# Patient Record
Sex: MALE | Race: ASIAN | NOT HISPANIC OR LATINO | Employment: FULL TIME | URBAN - METROPOLITAN AREA
[De-identification: names, ages, dates, MRNs, and addresses within clinical notes are randomized per-mention and may not be internally consistent; named-entity substitution may affect disease eponyms.]

---

## 2017-04-03 ENCOUNTER — OFFICE VISIT (OUTPATIENT)
Dept: INTERNAL MEDICINE | Age: 47
End: 2017-04-03

## 2017-04-03 VITALS
BODY MASS INDEX: 35.84 KG/M2 | HEIGHT: 66 IN | WEIGHT: 223 LBS | TEMPERATURE: 98.4 F | SYSTOLIC BLOOD PRESSURE: 112 MMHG | HEART RATE: 92 BPM | DIASTOLIC BLOOD PRESSURE: 80 MMHG | OXYGEN SATURATION: 97 %

## 2017-04-03 DIAGNOSIS — I10 ESSENTIAL HYPERTENSION: Chronic | ICD-10-CM

## 2017-04-03 DIAGNOSIS — E11.9 TYPE 2 DIABETES MELLITUS WITHOUT COMPLICATION, WITHOUT LONG-TERM CURRENT USE OF INSULIN (HCC): Primary | Chronic | ICD-10-CM

## 2017-04-03 DIAGNOSIS — C61 PROSTATE CANCER (HCC): Chronic | ICD-10-CM

## 2017-04-03 DIAGNOSIS — E78.2 MIXED HYPERLIPIDEMIA: Chronic | ICD-10-CM

## 2017-04-03 PROCEDURE — 90670 PCV13 VACCINE IM: CPT | Performed by: INTERNAL MEDICINE

## 2017-04-03 PROCEDURE — 90471 IMMUNIZATION ADMIN: CPT | Performed by: INTERNAL MEDICINE

## 2017-04-03 PROCEDURE — 99214 OFFICE O/P EST MOD 30 MIN: CPT | Performed by: INTERNAL MEDICINE

## 2017-04-03 NOTE — PROGRESS NOTES
"Mega Santiago / 46 y.o. / male  04/03/2017    VITALS    /80  Pulse 92  Temp 98.4 °F (36.9 °C)  Ht 66\" (167.6 cm)  Wt 223 lb (101 kg)  SpO2 97%  BMI 35.99 kg/m2  BP Readings from Last 3 Encounters:   04/03/17 112/80   12/05/16 122/80   09/01/16 104/64     Wt Readings from Last 3 Encounters:   04/03/17 223 lb (101 kg)   12/05/16 220 lb (99.8 kg)   09/01/16 216 lb (98 kg)      Body mass index is 35.99 kg/(m^2).    CC:  Main reason(s) for today's visit: Chronic medical; Diabetes; and pt states he had an episode of vertigo last week      HPI:     Chronic type 2 diabetes     Weight has been trending up. Not watching diet/exercise closely.  Home blood sugar levels: does not check  Current therapy: metformin and DPP4 inhibitor    Most recent relevant labs:   Lab Results   Component Value Date    HGBA1C 6.40 (H) 09/01/2016    HGBA1C 5.69 (H) 05/03/2016    HGBA1C 6.7 (H) 09/28/2015    CREATININE 1.09 09/01/2016    LDL 78 09/01/2016    MICROALBUR <3.0 09/28/2015     Chronic essential hypertension  Home BP readings: DOES NOT CHECK BP AT HOME. Compliant with medication(s).  Denies significant problems or side effects. Most recent in-office blood pressure readings:   BP Readings from Last 3 Encounters:   04/03/17 112/80   12/05/16 122/80   09/01/16 104/64     Chronic hyperlipidemia  Current therapy include atorvastatin.  Denies significant problems with medication(s).  Most recent labs:   Lab Results   Component Value Date    LDL 78 09/01/2016    LDL 83 09/28/2015    HDL 49 09/01/2016    HDL 50 09/28/2015    TRIG 73 09/01/2016    TRIG 125 09/28/2015    CHOLHDLRATIO 2.90 09/01/2016    CHOLHDLRATIO 3.2 09/28/2015     H/o prostate cancer followed by [facility]. Planning to pursue treatment. Recent MRI showed no change per patient.     ____________________________________________________________________    ASSESSMENT & PLAN:    Problem List Items Addressed This Visit        High    Type 2 diabetes mellitus without " complication - Primary (Chronic)    Current Assessment & Plan     Improved on diet, exercise, wt loss.  Check BS twice weekly.  Continue Jentadueto.         Relevant Medications    JENTADUETO 2.5-1000 MG tablet    Other Relevant Orders    Hemoglobin A1c    Microalbumin / Creatinine Urine Ratio    TSH+Free T4    Pneumococcal Conjugate Vaccine 13-Valent All (Completed)       Medium    Hypertension (Chronic)    Overview     Stable. Continue valsartan/hctz 320/25.          Relevant Medications    valsartan-hydrochlorothiazide (DIOVAN-HCT) 320-25 MG per tablet       Low    Hyperlipidemia (Chronic)    Overview     Stable. Continue atorvastatin.          Current Assessment & Plan     Fasting labs next visit.          Relevant Medications    atorvastatin (LIPITOR) 40 MG tablet    Prostate cancer (Chronic)    Overview     *Seen at [facility] [place]: planned 1 year observation, no treatment at this time         Current Assessment & Plan     Seeing [facility] team. Planning to pursue treatment (HIFU therapy).             Orders Placed This Encounter   Procedures   • Pneumococcal Conjugate Vaccine 13-Valent All   • Hemoglobin A1c   • Microalbumin / Creatinine Urine Ratio   • TSH+Free T4       Summary/Discussion:     ·       Return in about 4 months (around 8/3/2017) for Diabetes, COME FASTING FOR LABS.    Future Appointments  Date Time Provider Department Center   8/3/2017 8:20 AM Alber Gray MD [facility] None       ____________________________________________________________________    REVIEW OF SYSTEMS    Review of Systems  As noted per HPI  Constitutional neg   Resp neg   CV neg    Gi neg  Gu neg change in urination  Other: As noted per HPI      PHYSICAL EXAMINATION    Physical Exam    Mega had a diabetic foot exam performed today.   During the foot exam he had a monofilament test performed.    Vascular Status -  His exam exhibits right foot vasculature abnormal. His exam exhibits left foot vasculature  abnormal.   Skin Integrity  -  His right foot skin is not intact.   Mega's left foot skin is not intact. .    Constitutional  No distress , obese   Cardiovascular Rate  normal . Rhythm: regular . Heart sounds:  normal    Pulmonary/Chest  Effort normal. Breath sounds:  normal   Psychiatric  Alert. Judgment and thought content normal. Mood normal      REVIEWED DATA:    Labs:   Lab Results   Component Value Date     09/01/2016    K 3.8 09/01/2016    AST 23 09/01/2016    ALT 37 09/01/2016    BUN 22 (H) 09/01/2016    CREATININE 1.09 09/01/2016    CREATININE 0.92 09/28/2015    EGFRIFNONA 73 09/01/2016    EGFRIFAFRI 88 09/01/2016       Lab Results   Component Value Date    GLU 95 09/01/2016     (H) 09/28/2015    HGBA1C 6.40 (H) 09/01/2016    HGBA1C 5.69 (H) 05/03/2016    HGBA1C 6.7 (H) 09/28/2015       Lab Results   Component Value Date    LDL 78 09/01/2016    LDL 83 09/28/2015    HDL 49 09/01/2016    TRIG 73 09/01/2016    CHOLHDLRATIO 2.90 09/01/2016       No results found for: TSH, FREET4     No results found for: WBC, HGB, PLT     Imaging:        Medical Tests:        Summary of old records / correspondence / consultant report:        Request outside records:          ALLERGIES:    Review of patient's allergies indicates no known allergies.    MEDICATIONS:  Current Outpatient Prescriptions   Medication Sig Dispense Refill   • atorvastatin (LIPITOR) 40 MG tablet Take 1 tablet by mouth daily. 90 tablet 1   • Cetirizine HCl 10 MG capsule Take 10 mg by mouth daily.     • Cholecalciferol (VITAMIN D) 2000 UNITS capsule Take 1 capsule by mouth.     • JENTADUETO 2.5-1000 MG tablet Take 1 tablet by mouth  twice a day 180 tablet 0   • valsartan-hydrochlorothiazide (DIOVAN-HCT) 320-25 MG per tablet Take 1 tablet by mouth daily. 90 tablet 3     PFSH    The following portions of the patient's history were reviewed and updated as appropriate: Allergies / Current Medications / Past Medical History / Surgical History  / Social History / Family History    PROBLEM LIST:    Patient Active Problem List   Diagnosis   • Atopic rhinitis   • Hyperlipidemia   • Hypertension   • Hypogonadism in male   • Obesity (BMI 30-39.9)   • Obstructive sleep apnea syndrome   • Type 2 diabetes mellitus without complication   • Vitamin D deficiency   • Prostate cancer   • DDD (degenerative disc disease), lumbar       PAST MEDICAL HX:    Past Medical History:   Diagnosis Date   • Diabetes mellitus    • Hyperlipidemia    • Hypertension    • Hypogonadism in male    • Obesity    • Sleep apnea    • Vitamin D deficiency        PAST SURGICAL HX:    Past Surgical History:   Procedure Laterality Date   • ANKLE SURGERY         SOCIAL HX:    Social History     Social History   • Marital status:      Spouse name: N/A   • Number of children: N/A   • Years of education: N/A     Occupational History   • [employer]      Social History Main Topics   • Smoking status: Never Smoker   • Smokeless tobacco: Never Used   • Alcohol use Yes   • Drug use: None   • Sexual activity: Not Asked     Other Topics Concern   • None     Social History Narrative       FAMILY HX:    Family History   Problem Relation Age of Onset   • Parkinsonism Maternal Uncle    • Colonic polyp Paternal Grandfather    • Diabetes Other    • Hyperlipidemia Other    • Hypertension Other    • Thyroid disease Other          **Dragon Disclaimer:   Much of this encounter note is an electronic transcription/translation of spoken language to printed text. The electronic translation of spoken language may permit erroneous, or at times, nonsensical words or phrases to be inadvertently transcribed. Although I have reviewed the note for such errors, some may still exist.

## 2017-04-04 ENCOUNTER — TELEPHONE (OUTPATIENT)
Dept: INTERNAL MEDICINE | Age: 47
End: 2017-04-04

## 2017-04-04 LAB
ALBUMIN/CREAT UR: <4 MG/G CREAT (ref 0–30)
CREAT UR-MCNC: 75.1 MG/DL
HBA1C MFR BLD: 6.95 % (ref 4.8–5.6)
MICROALBUMIN UR-MCNC: <3 UG/ML
T4 FREE SERPL-MCNC: 1.19 NG/DL (ref 0.93–1.7)
TSH SERPL DL<=0.005 MIU/L-ACNC: 2.76 MIU/ML (ref 0.27–4.2)

## 2017-04-04 NOTE — TELEPHONE ENCOUNTER
----- Message from Alber Gray MD sent at 4/4/2017 12:31 PM EDT -----  Call patient with his test result(s) and mail the results to him if MyChart is NOT active.    Diabetes is going up. Start Jardiance 10 mg QAM (dx: DM 2).  Continue Jentadueto.

## 2017-04-04 NOTE — PROGRESS NOTES
Call patient with his test result(s) and mail the results to him if MyChart is NOT active.    Diabetes is going up. Start Jardiance 10 mg QAM (dx: DM 2).  Continue Jentadueto.

## 2017-04-10 ENCOUNTER — TELEPHONE (OUTPATIENT)
Dept: INTERNAL MEDICINE | Age: 47
End: 2017-04-10

## 2017-04-10 DIAGNOSIS — E11.9 TYPE 2 DIABETES MELLITUS WITHOUT COMPLICATION, WITHOUT LONG-TERM CURRENT USE OF INSULIN (HCC): Primary | Chronic | ICD-10-CM

## 2017-04-10 DIAGNOSIS — IMO0002 UNCONTROLLED TYPE 2 DIABETES MELLITUS: Chronic | ICD-10-CM

## 2017-04-10 RX ORDER — LINAGLIPTIN AND METFORMIN HYDROCHLORIDE 2.5; 1 MG/1; MG/1
TABLET, FILM COATED ORAL
Qty: 180 TABLET | Refills: 1 | Status: SHIPPED | OUTPATIENT
Start: 2017-04-10 | End: 2018-03-25 | Stop reason: SDUPTHER

## 2017-04-10 NOTE — TELEPHONE ENCOUNTER
Called back to get more information from pt concerning this issue, pt states that his temp has only been running 97.9, no SOA, some drainage, which he had upon arrival last Monday, which has never subsided, but has escalated, has some URI/ chest congestion with green mucus since his pneumonia  shot last Monday, has been taking mucin ex  DM and robitussin, has been achy along with all this but wanted to make sure this was not a side affect from the injection. Please advise. NISREEN

## 2017-07-17 DIAGNOSIS — E78.5 HYPERLIPIDEMIA: ICD-10-CM

## 2017-07-17 RX ORDER — ATORVASTATIN CALCIUM 40 MG/1
TABLET, FILM COATED ORAL
Qty: 90 TABLET | Refills: 0 | Status: SHIPPED | OUTPATIENT
Start: 2017-07-17 | End: 2017-12-15 | Stop reason: SDUPTHER

## 2017-07-17 RX ORDER — VALSARTAN AND HYDROCHLOROTHIAZIDE 320; 25 MG/1; MG/1
TABLET, FILM COATED ORAL
Qty: 90 TABLET | Refills: 0 | Status: SHIPPED | OUTPATIENT
Start: 2017-07-17 | End: 2017-12-19 | Stop reason: SDUPTHER

## 2017-10-27 DIAGNOSIS — I10 ESSENTIAL HYPERTENSION: Primary | ICD-10-CM

## 2017-10-27 RX ORDER — VALSARTAN AND HYDROCHLOROTHIAZIDE 320; 25 MG/1; MG/1
1 TABLET, FILM COATED ORAL DAILY
Qty: 90 TABLET | Refills: 0 | Status: SHIPPED | OUTPATIENT
Start: 2017-10-27 | End: 2017-12-19 | Stop reason: SDUPTHER

## 2017-12-15 DIAGNOSIS — E78.5 HYPERLIPIDEMIA: ICD-10-CM

## 2017-12-18 RX ORDER — ATORVASTATIN CALCIUM 40 MG/1
TABLET, FILM COATED ORAL
Qty: 90 TABLET | Refills: 0 | Status: SHIPPED | OUTPATIENT
Start: 2017-12-18 | End: 2018-03-25 | Stop reason: SDUPTHER

## 2017-12-19 ENCOUNTER — OFFICE VISIT (OUTPATIENT)
Dept: INTERNAL MEDICINE | Age: 47
End: 2017-12-19

## 2017-12-19 VITALS
TEMPERATURE: 97.7 F | OXYGEN SATURATION: 95 % | SYSTOLIC BLOOD PRESSURE: 116 MMHG | DIASTOLIC BLOOD PRESSURE: 74 MMHG | WEIGHT: 216 LBS | HEART RATE: 76 BPM | HEIGHT: 66 IN | BODY MASS INDEX: 34.72 KG/M2

## 2017-12-19 DIAGNOSIS — G47.33 OBSTRUCTIVE SLEEP APNEA SYNDROME: Chronic | ICD-10-CM

## 2017-12-19 DIAGNOSIS — Z00.00 PREVENTATIVE HEALTH CARE: Primary | ICD-10-CM

## 2017-12-19 DIAGNOSIS — E78.2 MIXED HYPERLIPIDEMIA: Chronic | ICD-10-CM

## 2017-12-19 DIAGNOSIS — C61 PROSTATE CANCER (HCC): Chronic | ICD-10-CM

## 2017-12-19 DIAGNOSIS — I10 ESSENTIAL HYPERTENSION: ICD-10-CM

## 2017-12-19 DIAGNOSIS — G47.33 OSA ON CPAP: ICD-10-CM

## 2017-12-19 DIAGNOSIS — E11.9 TYPE 2 DIABETES MELLITUS WITHOUT COMPLICATION, WITHOUT LONG-TERM CURRENT USE OF INSULIN (HCC): Primary | Chronic | ICD-10-CM

## 2017-12-19 DIAGNOSIS — Z99.89 OSA ON CPAP: ICD-10-CM

## 2017-12-19 PROCEDURE — 90472 IMMUNIZATION ADMIN EACH ADD: CPT | Performed by: INTERNAL MEDICINE

## 2017-12-19 PROCEDURE — 99214 OFFICE O/P EST MOD 30 MIN: CPT | Performed by: INTERNAL MEDICINE

## 2017-12-19 PROCEDURE — 90686 IIV4 VACC NO PRSV 0.5 ML IM: CPT | Performed by: INTERNAL MEDICINE

## 2017-12-19 PROCEDURE — 90732 PPSV23 VACC 2 YRS+ SUBQ/IM: CPT | Performed by: INTERNAL MEDICINE

## 2017-12-19 PROCEDURE — 90471 IMMUNIZATION ADMIN: CPT | Performed by: INTERNAL MEDICINE

## 2017-12-19 RX ORDER — VALSARTAN AND HYDROCHLOROTHIAZIDE 320; 25 MG/1; MG/1
1 TABLET, FILM COATED ORAL DAILY
Qty: 90 TABLET | Refills: 0
Start: 2017-12-19 | End: 2017-12-29 | Stop reason: SDUPTHER

## 2017-12-19 NOTE — PROGRESS NOTES
"[facility] – [place], [place] INTERNAL MEDICINE  ANGIE LAGUNA M.D.      Mega Glezino / 47 y.o. / male  12/19/2017      MEDICATIONS  Current Outpatient Prescriptions   Medication Sig Dispense Refill   • aspirin 81 MG EC tablet Take 1 tablet by mouth Daily.     • atorvastatin (LIPITOR) 40 MG tablet TAKE 1 TABLET BY MOUTH  DAILY 90 tablet 0   • Cetirizine HCl 10 MG capsule Take 10 mg by mouth daily.     • Cholecalciferol (VITAMIN D) 2000 UNITS capsule Take 1 capsule by mouth.     • JENTADUETO 2.5-1000 MG tablet Take 1 tablet by mouth  twice a day 180 tablet 1   • valsartan-hydrochlorothiazide (DIOVAN-HCT) 320-25 MG per tablet Take 1 tablet by mouth Daily. 90 tablet 0   • Canagliflozin 100 MG tablet Take 1 tablet by mouth Every Morning. 90 tablet 1     No current facility-administered medications for this visit.          VITALS:    Visit Vitals   • /74   • Pulse 76   • Temp 97.7 °F (36.5 °C)   • Ht 167.6 cm (65.98\")   • Wt 98 kg (216 lb)   • SpO2 95%   • BMI 34.88 kg/m2       BP Readings from Last 3 Encounters:   12/19/17 116/74   09/09/17 149/92   04/03/17 112/80     Wt Readings from Last 3 Encounters:   12/19/17 98 kg (216 lb)   09/09/17 100 kg (221 lb)   04/03/17 101 kg (223 lb)      Body mass index is 34.88 kg/(m^2).    CC:  Main reason(s) for today's visit: Diabetes      HPI:     Recent prostate bx did not show cancer but PSA has been slowly rising > 4. Under active surveillance by urologist.     Chronic type 2 diabetes:  Home blood sugar levels: about the same as before, has no significant low sugar symptoms/problems. Current therapy: Jentadueto.  Most recent relevant labs:   Lab Results   Component Value Date    HGBA1C 6.95 (H) 04/03/2017    HGBA1C 6.40 (H) 09/01/2016    HGBA1C 5.69 (H) 05/03/2016    CREATININE 1.09 09/01/2016    LDL 78 09/01/2016    MICROALBUR <3.0 04/03/2017     Chronic essential hypertension:  Since prior visit: compliant with medication(s) and denies significant problems with medication(s).  Most recent " in-office blood pressure readings:   BP Readings from Last 3 Encounters:   12/19/17 116/74   09/09/17 149/92   04/03/17 112/80     Chronic hyperlipidemia:  Current therapy include atorvastatin, denies problems with medication.    Most recent labs:   Lab Results   Component Value Date    LDL 78 09/01/2016    LDL 83 09/28/2015    HDL 49 09/01/2016    HDL 50 09/28/2015    TRIG 73 09/01/2016    CHOLHDLRATIO 2.90 09/01/2016     ELOISE on cpap. Needs cpap titration study.     Patient Care Team:  Alber Gray MD as PCP - General  Cesar Ramos DO as Consulting Physician (Ophthalmology)  ____________________________________________________________________    ASSESSMENT & PLAN:    Problem List Items Addressed This Visit     Type 2 diabetes mellitus without complication - Primary (Chronic)    Overview     Stable. Continue Jentadueto.         Relevant Medications    JENTADUETO 2.5-1000 MG tablet    Canagliflozin 100 MG tablet    Other Relevant Orders    Hemoglobin A1c    Comprehensive Metabolic Panel    Pneumococcal Polysaccharide Vaccine 23-Valent Greater Than or Equal To 3yo Subcutaneous / IM    Hypertension (Chronic)    Overview     Stable. Continue valsartan/hctz 320/25.          Relevant Medications    valsartan-hydrochlorothiazide (DIOVAN-HCT) 320-25 MG per tablet    Hyperlipidemia (Chronic)    Overview     Stable. Continue atorvastatin.          Relevant Medications    atorvastatin (LIPITOR) 40 MG tablet    Other Relevant Orders    Lipid Panel With / Chol / HDL Ratio    Obstructive sleep apnea syndrome (Chronic)    Overview     On CPAP         Prostate cancer (Chronic)    Overview     *Seen at [facility]: planned 1 year observation, no treatment at this time           Other Visit Diagnoses     ELOISE on CPAP        Relevant Orders    Ambulatory Referral to Sleep Medicine        Orders Placed This Encounter   Procedures   • Pneumococcal Polysaccharide Vaccine 23-Valent Greater Than or Equal To 3yo  Subcutaneous / IM   • Hemoglobin A1c   • Lipid Panel With / Chol / HDL Ratio   • Comprehensive Metabolic Panel   • Ambulatory Referral to Sleep Medicine       Summary/Discussion:  ·     Return in about 6 months (around 6/19/2018) for Diabetes and co-morbid conditions.    No future appointments.    ____________________________________________________________________    REVIEW OF SYSTEMS    Review of Systems  Constitutional neg x mild wt loss  Resp neg  CV neg   neg for change  Neuro neg      PHYSICAL EXAMINATION    Physical Exam  Constitutional  No distress, obese   Cardiovascular Rate  normal . Rhythm: regular . Heart sounds:  Normal  Pulmonary/Chest  Effort normal. Breath sounds:  Normal  Psychiatric  Alert. Judgment and thought content normal. Mood normal    REVIEWED DATA:    Labs:     Lab Results   Component Value Date     09/01/2016    K 3.8 09/01/2016    AST 23 09/01/2016    ALT 37 09/01/2016    BUN 22 (H) 09/01/2016    CREATININE 1.09 09/01/2016    CREATININE 0.92 09/28/2015    EGFRIFNONA 73 09/01/2016    EGFRIFAFRI 88 09/01/2016       Lab Results   Component Value Date    HGBA1C 6.95 (H) 04/03/2017    HGBA1C 6.40 (H) 09/01/2016    HGBA1C 5.69 (H) 05/03/2016    GLU 95 09/01/2016     (H) 09/28/2015    MICROALBUR <3.0 04/03/2017       Lab Results   Component Value Date    LDL 78 09/01/2016    LDL 83 09/28/2015    HDL 49 09/01/2016    TRIG 73 09/01/2016    CHOLHDLRATIO 2.90 09/01/2016       Lab Results   Component Value Date    TSH 2.760 04/03/2017    FREET4 1.19 04/03/2017       No results found for: WBC, HGB, PLT    Imaging:         Medical Tests:         Summary of old records / correspondence / consultant report:         Request outside records:         ALLERGIES  No Known Allergies     PFSH:     The following portions of the patient's history were reviewed and updated as appropriate: Allergies / Current Medications / Past Medical History / Surgical History / Social History / Family  History    PROBLEM LIST   Patient Active Problem List   Diagnosis   • Atopic rhinitis   • Hyperlipidemia   • Hypertension   • Hypogonadism in male   • Obesity (BMI 30-39.9)   • Obstructive sleep apnea syndrome   • Type 2 diabetes mellitus without complication   • Vitamin D deficiency   • Prostate cancer   • DDD (degenerative disc disease), lumbar       PAST MEDICAL HISTORY  Past Medical History:   Diagnosis Date   • Diabetes mellitus    • Hyperlipidemia    • Hypertension    • Hypogonadism in male    • Obesity    • Sleep apnea    • Vitamin D deficiency        SURGICAL HISTORY  Past Surgical History:   Procedure Laterality Date   • ANKLE SURGERY         SOCIAL HISTORY  Social History     Social History   • Marital status:      Spouse name: N/A   • Number of children: N/A   • Years of education: N/A     Occupational History   • [employer]      Social History Main Topics   • Smoking status: Never Smoker   • Smokeless tobacco: Never Used   • Alcohol use Yes   • Drug use: None   • Sexual activity: Not Asked     Other Topics Concern   • None     Social History Narrative       FAMILY HISTORY  Family History   Problem Relation Age of Onset   • Parkinsonism Maternal Uncle    • Colonic polyp Paternal Grandfather    • Diabetes Other    • Hyperlipidemia Other    • Hypertension Other    • Thyroid disease Other          **Dragon Disclaimer:   Much of this encounter note is an electronic transcription/translation of spoken language to printed text. The electronic translation of spoken language may permit erroneous, or at times, nonsensical words or phrases to be inadvertently transcribed. Although I have reviewed the note for such errors, some may still exist.

## 2017-12-20 LAB
ALBUMIN SERPL-MCNC: 4.9 G/DL (ref 3.5–5.2)
ALBUMIN/GLOB SERPL: 1.4 G/DL
ALP SERPL-CCNC: 73 U/L (ref 39–117)
ALT SERPL-CCNC: 41 U/L (ref 1–41)
AST SERPL-CCNC: 23 U/L (ref 1–40)
BILIRUB SERPL-MCNC: 0.4 MG/DL (ref 0.1–1.2)
BUN SERPL-MCNC: 20 MG/DL (ref 6–20)
BUN/CREAT SERPL: 16.5 (ref 7–25)
CALCIUM SERPL-MCNC: 9.8 MG/DL (ref 8.6–10.5)
CHLORIDE SERPL-SCNC: 98 MMOL/L (ref 98–107)
CHOLEST SERPL-MCNC: 150 MG/DL (ref 0–200)
CHOLEST/HDLC SERPL: 2.78 {RATIO}
CO2 SERPL-SCNC: 30.1 MMOL/L (ref 22–29)
CREAT SERPL-MCNC: 1.21 MG/DL (ref 0.76–1.27)
GLOBULIN SER CALC-MCNC: 3.5 GM/DL
GLUCOSE SERPL-MCNC: 73 MG/DL (ref 65–99)
HBA1C MFR BLD: 6.45 % (ref 4.8–5.6)
HDLC SERPL-MCNC: 54 MG/DL (ref 40–60)
LDLC SERPL CALC-MCNC: 74 MG/DL (ref 0–100)
POTASSIUM SERPL-SCNC: 4 MMOL/L (ref 3.5–5.2)
PROT SERPL-MCNC: 8.4 G/DL (ref 6–8.5)
SODIUM SERPL-SCNC: 140 MMOL/L (ref 136–145)
TRIGL SERPL-MCNC: 112 MG/DL (ref 0–150)
VLDLC SERPL CALC-MCNC: 22.4 MG/DL (ref 5–40)

## 2017-12-29 DIAGNOSIS — I10 ESSENTIAL HYPERTENSION: ICD-10-CM

## 2018-01-02 RX ORDER — VALSARTAN AND HYDROCHLOROTHIAZIDE 320; 25 MG/1; MG/1
1 TABLET, FILM COATED ORAL DAILY
Qty: 90 TABLET | Refills: 1 | Status: SHIPPED | OUTPATIENT
Start: 2018-01-02 | End: 2018-06-17 | Stop reason: SDUPTHER

## 2018-03-25 DIAGNOSIS — E78.5 HYPERLIPIDEMIA: ICD-10-CM

## 2018-03-25 DIAGNOSIS — IMO0002 UNCONTROLLED TYPE 2 DIABETES MELLITUS: Chronic | ICD-10-CM

## 2018-03-26 RX ORDER — LINAGLIPTIN AND METFORMIN HYDROCHLORIDE 2.5; 1 MG/1; MG/1
TABLET, FILM COATED ORAL
Qty: 180 TABLET | Refills: 0 | Status: SHIPPED | OUTPATIENT
Start: 2018-03-26 | End: 2018-09-18 | Stop reason: SDUPTHER

## 2018-03-26 RX ORDER — ATORVASTATIN CALCIUM 40 MG/1
TABLET, FILM COATED ORAL
Qty: 90 TABLET | Refills: 0 | Status: SHIPPED | OUTPATIENT
Start: 2018-03-26 | End: 2018-06-18 | Stop reason: SDUPTHER

## 2018-05-08 ENCOUNTER — OFFICE VISIT (OUTPATIENT)
Dept: INTERNAL MEDICINE | Age: 48
End: 2018-05-08

## 2018-05-08 VITALS
DIASTOLIC BLOOD PRESSURE: 80 MMHG | BODY MASS INDEX: 34.72 KG/M2 | WEIGHT: 216 LBS | TEMPERATURE: 98 F | HEART RATE: 60 BPM | OXYGEN SATURATION: 98 % | HEIGHT: 66 IN | SYSTOLIC BLOOD PRESSURE: 118 MMHG

## 2018-05-08 DIAGNOSIS — Z00.00 PREVENTATIVE HEALTH CARE: Primary | ICD-10-CM

## 2018-05-08 DIAGNOSIS — M26.609 TMJ (TEMPOROMANDIBULAR JOINT DISORDER): Primary | ICD-10-CM

## 2018-05-08 PROCEDURE — 90471 IMMUNIZATION ADMIN: CPT | Performed by: INTERNAL MEDICINE

## 2018-05-08 PROCEDURE — 90632 HEPA VACCINE ADULT IM: CPT | Performed by: INTERNAL MEDICINE

## 2018-05-08 PROCEDURE — 99213 OFFICE O/P EST LOW 20 MIN: CPT | Performed by: INTERNAL MEDICINE

## 2018-05-08 NOTE — ASSESSMENT & PLAN NOTE
This is a new problem to this examiner. Educational handout given on condition. Recommended seeing his dentist for night bite guard fitting. Aleve 2 BID x 5 days. Ice/heat as needed. He was instructed to call or return if the condition is not improving or worsening. He expressed understanding and agreed to follow above instructions.

## 2018-05-08 NOTE — PROGRESS NOTES
"[facility] – [place] INTERNAL MEDICINE  ANGIE LAGUNA M.D.      Mega Santiago / 47 y.o. / male  05/08/2018      ASSESSMENT & PLAN:    Problem List Items Addressed This Visit        High    TMJ (temporomandibular joint disorder) - Primary    Current Assessment & Plan     This is a new problem to this examiner. Educational handout given on condition. Recommended seeing his dentist for night bite guard fitting. Aleve 2 BID x 5 days. Ice/heat as needed. He was instructed to call or return if the condition is not improving or worsening. He expressed understanding and agreed to follow above instructions.             Other Visit Diagnoses    None.          Summary/Discussion:      Return for worsening problem or if not improving, Next scheduled follow up.    Future Appointments  Date Time Provider Department Center   6/19/2018 7:40 AM Alber Laguna MD [facility] None     ____________________________________________________________________    MEDICATIONS  Current Outpatient Prescriptions   Medication Sig Dispense Refill   • aspirin 81 MG EC tablet Take 1 tablet by mouth Daily.     • atorvastatin (LIPITOR) 40 MG tablet TAKE 1 TABLET BY MOUTH  DAILY 90 tablet 0   • Canagliflozin 100 MG tablet Take 1 tablet by mouth Every Morning. 90 tablet 1   • Cetirizine HCl 10 MG capsule Take 10 mg by mouth daily.     • Cholecalciferol (VITAMIN D) 2000 UNITS capsule Take 1 capsule by mouth.     • JENTADUETO 2.5-1000 MG tablet TAKE 1 TABLET BY MOUTH  TWICE A  tablet 0   • valsartan-hydrochlorothiazide (DIOVAN-HCT) 320-25 MG per tablet Take 1 tablet by mouth Daily. 90 tablet 1     No current facility-administered medications for this visit.          VITALS    Visit Vitals  /80   Pulse 60   Temp 98 °F (36.7 °C)   Ht 167.6 cm (65.98\")   Wt 98 kg (216 lb)   SpO2 98%   BMI 34.88 kg/m²       BP Readings from Last 3 Encounters:   05/08/18 118/80   12/19/17 116/74   09/09/17 149/92     Wt Readings from Last 3 Encounters:   05/08/18 98 kg (216 lb) "   12/19/17 98 kg (216 lb)   09/09/17 100 kg (221 lb)      Body mass index is 34.88 kg/m².      CC:  Main reason(s) for today's visit: Earache (right ear x 2 weeks)      HPI:     2 weeks of right jaw pain. Worse with chewing. Has shooting pain up the right temple. Some right ear pain without fever or drainage.   Right side neck pain. Denies sinus pressure/pain. Denies dental pain. Has h/o cracking his teeth. Has not seen dentist in several years.   Pain is generally moderate in severity.     Patient Care Team:  Alber Gray MD as PCP - General  Cesar Ramos DO as Consulting Physician (Ophthalmology)  ____________________________________________________________________      REVIEW OF SYSTEMS    Review of Systems  No fever  No parotid mass  No sinus pain    PHYSICAL EXAMINATION    Physical Exam  NAD  No parotid mass  TM's clear, no drainage  No sinus tenderness  No cervical LAD    REVIEWED DATA:    Labs:       Imaging:        Medical Tests:        Summary of old records / correspondence / consultant report:        Request outside records:       ALLERGIES  No Known Allergies     PFSH:     The following portions of the patient's history were reviewed and updated as appropriate: Allergies / Current Medications / Past Medical History / Surgical History / Social History / Family History    PROBLEM LIST   Patient Active Problem List   Diagnosis   • Atopic rhinitis   • Hyperlipidemia   • Hypertension   • Hypogonadism in male   • Obesity (BMI 30-39.9)   • Obstructive sleep apnea syndrome   • Type 2 diabetes mellitus without complication   • Vitamin D deficiency   • Prostate cancer   • DDD (degenerative disc disease), lumbar   • TMJ (temporomandibular joint disorder)       PAST MEDICAL HISTORY  Past Medical History:   Diagnosis Date   • Diabetes mellitus    • Hyperlipidemia    • Hypertension    • Hypogonadism in male    • Obesity    • Sleep apnea    • Vitamin D deficiency        SURGICAL HISTORY  Past Surgical History:    Procedure Laterality Date   • ANKLE SURGERY         SOCIAL HISTORY  Social History     Social History   • Marital status:      Occupational History   • [employer]      Social History Main Topics   • Smoking status: Never Smoker   • Smokeless tobacco: Never Used   • Alcohol use Yes   • Drug use: Unknown     Other Topics Concern   • Not on file       FAMILY HISTORY  Family History   Problem Relation Age of Onset   • Parkinsonism Maternal Uncle    • Colonic polyp Paternal Grandfather    • Diabetes Other    • Hyperlipidemia Other    • Hypertension Other    • Thyroid disease Other          **Dragon Disclaimer:   Much of this encounter note is an electronic transcription/translation of spoken language to printed text. The electronic translation of spoken language may permit erroneous, or at times, nonsensical words or phrases to be inadvertently transcribed. Although I have reviewed the note for such errors, some may still exist.

## 2018-06-17 DIAGNOSIS — I10 ESSENTIAL HYPERTENSION: ICD-10-CM

## 2018-06-18 DIAGNOSIS — E78.5 HYPERLIPIDEMIA: ICD-10-CM

## 2018-06-18 RX ORDER — ATORVASTATIN CALCIUM 40 MG/1
TABLET, FILM COATED ORAL
Qty: 90 TABLET | Refills: 1 | Status: SHIPPED | OUTPATIENT
Start: 2018-06-18 | End: 2018-12-07 | Stop reason: SDUPTHER

## 2018-06-18 RX ORDER — VALSARTAN AND HYDROCHLOROTHIAZIDE 320; 25 MG/1; MG/1
1 TABLET, FILM COATED ORAL DAILY
Qty: 90 TABLET | Refills: 1 | Status: SHIPPED | OUTPATIENT
Start: 2018-06-18 | End: 2018-06-20 | Stop reason: SDUPTHER

## 2018-06-19 ENCOUNTER — OFFICE VISIT (OUTPATIENT)
Dept: INTERNAL MEDICINE | Age: 48
End: 2018-06-19

## 2018-06-19 VITALS
DIASTOLIC BLOOD PRESSURE: 68 MMHG | SYSTOLIC BLOOD PRESSURE: 118 MMHG | HEART RATE: 80 BPM | HEIGHT: 66 IN | BODY MASS INDEX: 34.55 KG/M2 | TEMPERATURE: 98 F | OXYGEN SATURATION: 98 % | WEIGHT: 215 LBS

## 2018-06-19 DIAGNOSIS — E11.9 TYPE 2 DIABETES MELLITUS WITHOUT COMPLICATION, WITHOUT LONG-TERM CURRENT USE OF INSULIN (HCC): Primary | Chronic | ICD-10-CM

## 2018-06-19 DIAGNOSIS — I10 ESSENTIAL HYPERTENSION: Chronic | ICD-10-CM

## 2018-06-19 DIAGNOSIS — E78.2 MIXED HYPERLIPIDEMIA: Chronic | ICD-10-CM

## 2018-06-19 PROBLEM — M26.609 TMJ (TEMPOROMANDIBULAR JOINT DISORDER): Status: RESOLVED | Noted: 2018-05-08 | Resolved: 2018-06-19

## 2018-06-19 PROCEDURE — 99214 OFFICE O/P EST MOD 30 MIN: CPT | Performed by: INTERNAL MEDICINE

## 2018-06-19 NOTE — PROGRESS NOTES
"[facility] [place] INTERNAL MEDICINE  ANGIE LAGUNA M.D.      Mega Santiago / 47 y.o. / male  06/19/2018      ASSESSMENT & PLAN:    Problem List Items Addressed This Visit        High    Type 2 diabetes mellitus without complication - Primary (Chronic)    Current Assessment & Plan     Continue Jentadueto.          Relevant Medications    JENTADUETO 2.5-1000 MG tablet    Other Relevant Orders    Hemoglobin A1c    Comprehensive Metabolic Panel    Microalbumin / Creatinine Urine Ratio - Urine, Clean Catch       Medium    Hypertension (Chronic)    Overview     Stable. Continue valsartan/hctz 320/25.          Relevant Medications    valsartan-hydrochlorothiazide (DIOVAN-HCT) 320-25 MG per tablet       Low    Hyperlipidemia (Chronic)    Overview     Stable. Continue atorvastatin.          Relevant Medications    atorvastatin (LIPITOR) 40 MG tablet        Orders Placed This Encounter   Procedures   • Hemoglobin A1c   • Comprehensive Metabolic Panel   • Microalbumin / Creatinine Urine Ratio - Urine, Clean Catch       Summary/Discussion:      Return in about 6 months (around 12/19/2018) for Diabetes and co-morbid conditions.  ____________________________________________________________________    MEDICATIONS  Current Outpatient Prescriptions   Medication Sig Dispense Refill   • aspirin 81 MG EC tablet Take 1 tablet by mouth Daily.     • atorvastatin (LIPITOR) 40 MG tablet TAKE 1 TABLET BY MOUTH  DAILY 90 tablet 1   • Cetirizine HCl 10 MG capsule Take 10 mg by mouth daily.     • Cholecalciferol (VITAMIN D) 2000 UNITS capsule Take 1 capsule by mouth.     • JENTADUETO 2.5-1000 MG tablet TAKE 1 TABLET BY MOUTH  TWICE A  tablet 0   • valsartan-hydrochlorothiazide (DIOVAN-HCT) 320-25 MG per tablet TAKE 1 TABLET BY MOUTH  DAILY 90 tablet 1     No current facility-administered medications for this visit.          VITALS    Visit Vitals  /68   Pulse 80   Temp 98 °F (36.7 °C)   Ht 167.6 cm (65.98\")   Wt 97.5 kg (215 lb)   SpO2 98% "   BMI 34.72 kg/m²       BP Readings from Last 3 Encounters:   06/19/18 118/68   05/08/18 118/80   12/19/17 116/74     Wt Readings from Last 3 Encounters:   06/19/18 97.5 kg (215 lb)   05/08/18 98 kg (216 lb)   12/19/17 98 kg (216 lb)      Body mass index is 34.72 kg/m².    CC:  Main reason(s) for today's visit: Follow-up for diabetes and related medical problems    HPI:     Chronic type 2 diabetes:  Home blood sugar levels: does not check, has no significant low sugar symptoms/problems. Current therapy: Jentadueto.  Most recent relevant labs:   Lab Results   Component Value Date    HGBA1C 6.45 (H) 12/19/2017    HGBA1C 6.95 (H) 04/03/2017    HGBA1C 6.40 (H) 09/01/2016    CREATININE 1.21 12/19/2017    LDL 74 12/19/2017    MICROALBUR <3.0 04/03/2017     Chronic essential hypertension:  Since prior visit: compliant with medication(s) and denies significant problems with medication(s).  Most recent in-office blood pressure readings:   BP Readings from Last 3 Encounters:   06/19/18 118/68   05/08/18 118/80   12/19/17 116/74     Chronic hyperlipidemia:  Current therapy include atorvastatin, denies problems with medication.    Most recent labs:   Lab Results   Component Value Date    LDL 74 12/19/2017    LDL 78 09/01/2016    LDL 83 09/28/2015    HDL 54 12/19/2017    HDL 49 09/01/2016    TRIG 112 12/19/2017    CHOLHDLRATIO 2.78 12/19/2017         Patient Care Team:  Alber Gray MD as PCP - General  Cesar Ramos DO as Consulting Physician (Ophthalmology)  ____________________________________________________________________    REVIEW OF SYSTEMS    Review of Systems  As noted per HPI  Constitutional neg  Resp neg  CV neg      PHYSICAL EXAMINATION    Physical Exam    Mega had a diabetic foot exam performed today.   During the foot exam he had a monofilament test performed (normal).  Vascular Status -  His right foot exhibits abnormal foot vasculature . His left foot exhibits abnormal foot vasculature .  Skin  Integrity  -  His right foot skin is not intact.  His left foot skin is not intact..    Constitutional  No distress, obese   Cardiovascular Rate  normal . Rhythm: regular . Heart sounds:  normal  Pulmonary/Chest  Effort normal. Breath sounds:  normal  Psychiatric  Alert. Judgment and thought content normal. Mood normal    REVIEWED DATA:    Labs:   Lab Results   Component Value Date     12/19/2017    K 4.0 12/19/2017    AST 23 12/19/2017    ALT 41 12/19/2017    BUN 20 12/19/2017    CREATININE 1.21 12/19/2017    CREATININE 1.09 09/01/2016    CREATININE 0.92 09/28/2015    EGFRIFNONA 64 12/19/2017    EGFRIFAFRI 77 12/19/2017       Lab Results   Component Value Date    HGBA1C 6.45 (H) 12/19/2017    HGBA1C 6.95 (H) 04/03/2017    HGBA1C 6.40 (H) 09/01/2016    MICROALBUR <3.0 04/03/2017       Lab Results   Component Value Date    LDL 74 12/19/2017    LDL 78 09/01/2016    LDL 83 09/28/2015    HDL 54 12/19/2017    HDL 49 09/01/2016    TRIG 112 12/19/2017    CHOLHDLRATIO 2.78 12/19/2017       Lab Results   Component Value Date    TSH 2.760 04/03/2017    FREET4 1.19 04/03/2017        No results found for: WBC, HGB, PLT     Imaging:        Medical Tests:        Summary of old records / correspondence / consultant report:        Request outside records:        ALLERGIES  No Known Allergies     PFSH:     The following portions of the patient's history were reviewed and updated as appropriate: Allergies / Current Medications / Past Medical History / Surgical History / Social History / Family History    PROBLEM LIST   Patient Active Problem List   Diagnosis   • Atopic rhinitis   • Hyperlipidemia   • Hypertension   • Hypogonadism in male   • Obesity (BMI 30-39.9)   • Obstructive sleep apnea syndrome   • Type 2 diabetes mellitus without complication   • Vitamin D deficiency   • Prostate cancer   • DDD (degenerative disc disease), lumbar       PAST MEDICAL HISTORY  Past Medical History:   Diagnosis Date   • Diabetes mellitus    •  Hyperlipidemia    • Hypertension    • Hypogonadism in male    • Obesity    • Sleep apnea    • Vitamin D deficiency        SURGICAL HISTORY  Past Surgical History:   Procedure Laterality Date   • ANKLE SURGERY         SOCIAL HISTORY  Social History     Social History   • Marital status:      Occupational History   • [employer]      Social History Main Topics   • Smoking status: Never Smoker   • Smokeless tobacco: Never Used   • Alcohol use Yes   • Drug use: Unknown     Other Topics Concern   • Not on file       FAMILY HISTORY  Family History   Problem Relation Age of Onset   • Parkinsonism Maternal Uncle    • Colonic polyp Paternal Grandfather    • Diabetes Other    • Hyperlipidemia Other    • Hypertension Other    • Thyroid disease Other          **Dragon Disclaimer:   Much of this encounter note is an electronic transcription/translation of spoken language to printed text. The electronic translation of spoken language may permit erroneous, or at times, nonsensical words or phrases to be inadvertently transcribed. Although I have reviewed the note for such errors, some may still exist.

## 2018-06-20 ENCOUNTER — TELEPHONE (OUTPATIENT)
Dept: INTERNAL MEDICINE | Age: 48
End: 2018-06-20

## 2018-06-20 DIAGNOSIS — I10 ESSENTIAL HYPERTENSION: ICD-10-CM

## 2018-06-20 LAB
ALBUMIN SERPL-MCNC: 4.5 G/DL (ref 3.5–5.2)
ALBUMIN/CREAT UR: 3.2 MG/G CREAT (ref 0–30)
ALBUMIN/GLOB SERPL: 1.4 G/DL
ALP SERPL-CCNC: 62 U/L (ref 39–117)
ALT SERPL-CCNC: 38 U/L (ref 1–41)
AST SERPL-CCNC: 20 U/L (ref 1–40)
BILIRUB SERPL-MCNC: 0.6 MG/DL (ref 0.1–1.2)
BUN SERPL-MCNC: 17 MG/DL (ref 6–20)
BUN/CREAT SERPL: 16.3 (ref 7–25)
CALCIUM SERPL-MCNC: 9.5 MG/DL (ref 8.6–10.5)
CHLORIDE SERPL-SCNC: 100 MMOL/L (ref 98–107)
CO2 SERPL-SCNC: 27.1 MMOL/L (ref 22–29)
CREAT SERPL-MCNC: 1.04 MG/DL (ref 0.76–1.27)
CREAT UR-MCNC: 158.2 MG/DL
GFR SERPLBLD CREATININE-BSD FMLA CKD-EPI: 76 ML/MIN/1.73
GFR SERPLBLD CREATININE-BSD FMLA CKD-EPI: 92 ML/MIN/1.73
GLOBULIN SER CALC-MCNC: 3.2 GM/DL
GLUCOSE SERPL-MCNC: 133 MG/DL (ref 65–99)
HBA1C MFR BLD: 6.42 % (ref 4.8–5.6)
MICROALBUMIN UR-MCNC: 5 UG/ML
POTASSIUM SERPL-SCNC: 3.9 MMOL/L (ref 3.5–5.2)
PROT SERPL-MCNC: 7.7 G/DL (ref 6–8.5)
SODIUM SERPL-SCNC: 141 MMOL/L (ref 136–145)

## 2018-06-20 RX ORDER — VALSARTAN AND HYDROCHLOROTHIAZIDE 320; 25 MG/1; MG/1
1 TABLET, FILM COATED ORAL DAILY
Qty: 90 TABLET | Refills: 1 | Status: SHIPPED | OUTPATIENT
Start: 2018-06-20 | End: 2018-06-20 | Stop reason: SDUPTHER

## 2018-06-20 RX ORDER — VALSARTAN AND HYDROCHLOROTHIAZIDE 320; 25 MG/1; MG/1
1 TABLET, FILM COATED ORAL DAILY
Qty: 90 TABLET | Refills: 1 | Status: SHIPPED | OUTPATIENT
Start: 2018-06-20 | End: 2018-12-04 | Stop reason: SDUPTHER

## 2018-06-20 NOTE — TELEPHONE ENCOUNTER
Pt needed a his valsartan sent to [facility]. It went to [facility] by mistake. He has already called [facility] and they are cancelling the order there. Please just send valsartan rx to [facility].

## 2018-07-19 ENCOUNTER — TELEPHONE (OUTPATIENT)
Dept: INTERNAL MEDICINE | Age: 48
End: 2018-07-19

## 2018-07-19 NOTE — TELEPHONE ENCOUNTER
Pt called regarding recall on medication Valsartan HCTZ. Pt asking what would Dr Gray replace this with?  [facility]  Pt's # [phone]

## 2018-09-18 DIAGNOSIS — IMO0002 UNCONTROLLED TYPE 2 DIABETES MELLITUS: Chronic | ICD-10-CM

## 2018-09-18 RX ORDER — LINAGLIPTIN AND METFORMIN HYDROCHLORIDE 2.5; 1 MG/1; MG/1
TABLET, FILM COATED ORAL
Qty: 180 TABLET | Refills: 3 | Status: SHIPPED | OUTPATIENT
Start: 2018-09-18 | End: 2020-03-09 | Stop reason: SDUPTHER

## 2018-10-26 ENCOUNTER — OFFICE VISIT (OUTPATIENT)
Dept: INTERNAL MEDICINE | Age: 48
End: 2018-10-26

## 2018-10-26 VITALS
WEIGHT: 212 LBS | SYSTOLIC BLOOD PRESSURE: 120 MMHG | BODY MASS INDEX: 34.07 KG/M2 | HEART RATE: 82 BPM | TEMPERATURE: 98 F | OXYGEN SATURATION: 99 % | HEIGHT: 66 IN | DIASTOLIC BLOOD PRESSURE: 80 MMHG

## 2018-10-26 DIAGNOSIS — Z23 FLU VACCINE NEED: ICD-10-CM

## 2018-10-26 DIAGNOSIS — N20.1 URETEROLITHIASIS: ICD-10-CM

## 2018-10-26 DIAGNOSIS — N13.30 HYDROURETERONEPHROSIS: Primary | ICD-10-CM

## 2018-10-26 PROCEDURE — 90674 CCIIV4 VAC NO PRSV 0.5 ML IM: CPT | Performed by: INTERNAL MEDICINE

## 2018-10-26 PROCEDURE — 90471 IMMUNIZATION ADMIN: CPT | Performed by: INTERNAL MEDICINE

## 2018-10-26 PROCEDURE — 99214 OFFICE O/P EST MOD 30 MIN: CPT | Performed by: INTERNAL MEDICINE

## 2018-10-26 NOTE — PROGRESS NOTES
"[facility] [place] [facility]NAL MEDICINE  ANGIE LAGUNA M.D.      Mega Santiago / 47 y.o. / male  10/26/2018      ASSESSMENT & PLAN:    1. Hydroureteronephrosis    2. Ureterolithiasis    3. Flu vaccine need      Orders Placed This Encounter   Procedures   • Flucelvax Quad=>4Years (PFS)     No orders of the defined types were placed in this encounter.       Summary/Discussion:  Recommended discussing with his urologist re: above. Should have follow-up imaging to confirm passage of stones and relief of obstruction.   Reassurance given that his palpable epigastric lump is xiphoid process.     No Follow-up on file.    ____________________________________________________________________    MEDICATIONS  Current Outpatient Prescriptions   Medication Sig Dispense Refill   • atorvastatin (LIPITOR) 40 MG tablet TAKE 1 TABLET BY MOUTH  DAILY 90 tablet 1   • Cetirizine HCl 10 MG capsule Take 10 mg by mouth daily.     • Cholecalciferol (VITAMIN D) 2000 UNITS capsule Take 1 capsule by mouth.     • JENTADUETO 2.5-1000 MG tablet TAKE 1 TABLET BY MOUTH  TWICE A  tablet 3   • valsartan-hydrochlorothiazide (DIOVAN-HCT) 320-25 MG per tablet Take 1 tablet by mouth Daily. 90 tablet 1   • aspirin 81 MG EC tablet Take 1 tablet by mouth Daily.       No current facility-administered medications for this visit.          VITALS    Visit Vitals  /80   Pulse 82   Temp 98 °F (36.7 °C)   Ht 167.6 cm (65.98\")   Wt 96.2 kg (212 lb)   SpO2 99%   BMI 34.23 kg/m²       BP Readings from Last 3 Encounters:   10/26/18 120/80   06/19/18 118/68   05/08/18 118/80     Wt Readings from Last 3 Encounters:   10/26/18 96.2 kg (212 lb)   06/19/18 97.5 kg (215 lb)   05/08/18 98 kg (216 lb)      Body mass index is 34.23 kg/m².      CC:  Main reason(s) for today's visit: lump on chest (x 2 weeks)      HPI:     Noticed a \"lump\" on epigastric/lower chest region. No pain or swelling.     Seen in ED in September for right ureterolithiasis with mild hydroureteronephrosis. " Present with severe right flank pain.  Pain has resolved since then.  Denies fever, gross hematuria or recurrent flank pain.  Denies changes in urination.  He has not followed up with the urologist as instructed from the ER.    Patient Care Team:  Alber Gray MD as PCP - Cesar Sweet DO as Consulting Physician (Ophthalmology)  ____________________________________________________________________      REVIEW OF SYSTEMS    Review of Systems  GI neg   neg for gross hematuria, fever    PHYSICAL EXAMINATION    Physical Exam   Constitutional: No distress.   Abdominal: Soft. There is no tenderness. No hernia.             REVIEWED DATA:    Labs:       Imaging:   EXAMINATION: CT OF THE ABDOMEN AND PELVIS  WITH IV CONTRAST.    DATE OF EXAM(S): 09/16/2018 at 0115 hours.    CLINICAL HISTORY: Abdominal pain, right flank pain radiating to the right lower quadrant; mildly elevated amylase, normal WBC.    MIPS: Dose reduction techniques were employed per ALARA protocol.    FINDINGS: Mild right hydroureteronephrosis is seen to the level of two adjacent  3 mm calculi in the proximal right ureter (coronal image 57), located just above the L4-L5 disc level. Several right renal cortical cysts are noted, largest in the mid   kidney measuring 3.8 cm. The left kidney, ureter, and bladder are normal.    The appendix is normal. Bowel loops are normal in caliber with no wall thickening or inflammation. The stomach contains moderate fluid and debris.    Diffuse fatty infiltration of the liver is noted. Spleen, contracted gallbladder, pancreas, and adrenal glands are normal. No acute bone abnormality. Lung bases show mild dependent atelectasis.    IMPRESSION:  1. Mild right hydroureteronephrosis to the level of two adjacent 3 mm calculi in the proximal ureter.  2. Fatty infiltration of the liver.     Medical Tests:       Summary of old records / correspondence / consultant report:        Request outside records:        ALLERGIES  No Known Allergies     PFSH:     The following portions of the patient's history were reviewed and updated as appropriate: Allergies / Current Medications / Past Medical History / Surgical History / Social History / Family History    PROBLEM LIST   Patient Active Problem List   Diagnosis   • Atopic rhinitis   • Hyperlipidemia   • Hypertension   • Hypogonadism in male   • Obesity (BMI 30-39.9)   • Obstructive sleep apnea syndrome   • Type 2 diabetes mellitus without complication (CMS/HCC)   • Vitamin D deficiency   • Prostate cancer (CMS/HCC)   • DDD (degenerative disc disease), lumbar   • Flu vaccine need       PAST MEDICAL HISTORY  Past Medical History:   Diagnosis Date   • Diabetes mellitus (CMS/HCC)    • Hyperlipidemia    • Hypertension    • Hypogonadism in male    • Obesity    • Sleep apnea    • Vitamin D deficiency        SURGICAL HISTORY  Past Surgical History:   Procedure Laterality Date   • ANKLE SURGERY         SOCIAL HISTORY  Social History     Social History   • Marital status:      Occupational History   • [employer]      Social History Main Topics   • Smoking status: Never Smoker   • Smokeless tobacco: Never Used   • Alcohol use Yes   • Drug use: Unknown     Other Topics Concern   • Not on file       FAMILY HISTORY  Family History   Problem Relation Age of Onset   • Parkinsonism Maternal Uncle    • Colonic polyp Paternal Grandfather    • Diabetes Other    • Hyperlipidemia Other    • Hypertension Other    • Thyroid disease Other          **Dragon Disclaimer:   Much of this encounter note is an electronic transcription/translation of spoken language to printed text. The electronic translation of spoken language may permit erroneous, or at times, nonsensical words or phrases to be inadvertently transcribed. Although I have reviewed the note for such errors, some may still exist.

## 2018-12-04 DIAGNOSIS — I10 ESSENTIAL HYPERTENSION: ICD-10-CM

## 2018-12-05 RX ORDER — VALSARTAN AND HYDROCHLOROTHIAZIDE 320; 25 MG/1; MG/1
1 TABLET, FILM COATED ORAL DAILY
Qty: 90 TABLET | Refills: 1 | Status: SHIPPED | OUTPATIENT
Start: 2018-12-05 | End: 2019-07-02 | Stop reason: SDUPTHER

## 2018-12-07 DIAGNOSIS — E78.5 HYPERLIPIDEMIA: ICD-10-CM

## 2018-12-07 RX ORDER — ATORVASTATIN CALCIUM 40 MG/1
TABLET, FILM COATED ORAL
Qty: 90 TABLET | Refills: 3 | Status: SHIPPED | OUTPATIENT
Start: 2018-12-07 | End: 2019-12-27

## 2018-12-14 ENCOUNTER — OFFICE VISIT (OUTPATIENT)
Dept: INTERNAL MEDICINE | Age: 48
End: 2018-12-14

## 2018-12-14 VITALS
HEART RATE: 78 BPM | WEIGHT: 213 LBS | DIASTOLIC BLOOD PRESSURE: 78 MMHG | SYSTOLIC BLOOD PRESSURE: 120 MMHG | BODY MASS INDEX: 34.23 KG/M2 | TEMPERATURE: 98.5 F | OXYGEN SATURATION: 97 % | HEIGHT: 66 IN

## 2018-12-14 DIAGNOSIS — E78.2 MIXED HYPERLIPIDEMIA: Chronic | ICD-10-CM

## 2018-12-14 DIAGNOSIS — I10 ESSENTIAL HYPERTENSION: Chronic | ICD-10-CM

## 2018-12-14 DIAGNOSIS — Z23 NEED FOR HEPATITIS A VACCINATION: ICD-10-CM

## 2018-12-14 DIAGNOSIS — E11.9 TYPE 2 DIABETES MELLITUS WITHOUT COMPLICATION, WITHOUT LONG-TERM CURRENT USE OF INSULIN (HCC): Primary | Chronic | ICD-10-CM

## 2018-12-14 LAB
CHOLEST SERPL-MCNC: 151 MG/DL (ref 0–200)
CHOLEST/HDLC SERPL: 2.8 {RATIO}
HBA1C MFR BLD: 6 % (ref 4.8–5.6)
HDLC SERPL-MCNC: 54 MG/DL (ref 40–60)
LDLC SERPL CALC-MCNC: 79 MG/DL (ref 0–100)
TRIGL SERPL-MCNC: 89 MG/DL (ref 0–150)
VLDLC SERPL CALC-MCNC: 17.8 MG/DL (ref 5–40)

## 2018-12-14 PROCEDURE — 99214 OFFICE O/P EST MOD 30 MIN: CPT | Performed by: INTERNAL MEDICINE

## 2018-12-14 PROCEDURE — 90632 HEPA VACCINE ADULT IM: CPT | Performed by: INTERNAL MEDICINE

## 2018-12-14 PROCEDURE — 90471 IMMUNIZATION ADMIN: CPT | Performed by: INTERNAL MEDICINE
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
## 2019-07-02 DIAGNOSIS — I10 ESSENTIAL HYPERTENSION: ICD-10-CM

## 2019-07-02 RX ORDER — VALSARTAN AND HYDROCHLOROTHIAZIDE 320; 25 MG/1; MG/1
1 TABLET, FILM COATED ORAL DAILY
Qty: 90 TABLET | Refills: 3 | Status: SHIPPED | OUTPATIENT
Start: 2019-07-02 | End: 2020-09-17 | Stop reason: SDUPTHER

## 2019-09-12 ENCOUNTER — OFFICE VISIT (OUTPATIENT)
Dept: INTERNAL MEDICINE | Age: 49
End: 2019-09-12

## 2019-09-12 VITALS
TEMPERATURE: 97.5 F | DIASTOLIC BLOOD PRESSURE: 84 MMHG | BODY MASS INDEX: 34.78 KG/M2 | OXYGEN SATURATION: 92 % | HEART RATE: 95 BPM | SYSTOLIC BLOOD PRESSURE: 126 MMHG | WEIGHT: 216.4 LBS | HEIGHT: 66 IN

## 2019-09-12 DIAGNOSIS — I10 ESSENTIAL HYPERTENSION: Chronic | ICD-10-CM

## 2019-09-12 DIAGNOSIS — Z00.00 ENCOUNTER FOR ANNUAL HEALTH EXAMINATION: Primary | ICD-10-CM

## 2019-09-12 DIAGNOSIS — E11.9 TYPE 2 DIABETES MELLITUS WITHOUT COMPLICATION, WITHOUT LONG-TERM CURRENT USE OF INSULIN (HCC): Chronic | ICD-10-CM

## 2019-09-12 DIAGNOSIS — C61 PROSTATE CANCER (HCC): Chronic | ICD-10-CM

## 2019-09-12 DIAGNOSIS — E78.2 MIXED HYPERLIPIDEMIA: Chronic | ICD-10-CM

## 2019-09-12 PROCEDURE — 99396 PREV VISIT EST AGE 40-64: CPT | Performed by: INTERNAL MEDICINE

## 2019-09-12 PROCEDURE — 99213 OFFICE O/P EST LOW 20 MIN: CPT | Performed by: INTERNAL MEDICINE

## 2019-09-12 NOTE — PROGRESS NOTES
"[facility] [place] INTERNAL MEDICINE  ANGIE LAGUNA M.D.      Mega Santiago / 48 y.o. / male  09/12/2019      ASSESSMENT & PLAN:    Problem List Items Addressed This Visit     None        No orders of the defined types were placed in this encounter.    No orders of the defined types were placed in this encounter.      Summary/Discussion:      Next Appointment with me: Visit date not found    No Follow-up on file.  ____________________________________________________________________    MEDICATIONS  Current Outpatient Medications   Medication Sig Dispense Refill   • atorvastatin (LIPITOR) 40 MG tablet TAKE 1 TABLET BY MOUTH  DAILY 90 tablet 3   • Cetirizine HCl 10 MG capsule Take 10 mg by mouth daily.     • Cholecalciferol (VITAMIN D) 2000 UNITS capsule Take 1 capsule by mouth.     • JENTADUETO 2.5-1000 MG tablet TAKE 1 TABLET BY MOUTH  TWICE A  tablet 3   • valsartan-hydrochlorothiazide (DIOVAN-HCT) 320-25 MG per tablet TAKE 1 TABLET BY MOUTH  DAILY 90 tablet 3   • aspirin 81 MG EC tablet Take 1 tablet by mouth Daily.       No current facility-administered medications for this visit.        VITALS    Visit Vitals  /84   Pulse 95   Temp 97.5 °F (36.4 °C)   Ht 167.6 cm (65.98\")   Wt 98.2 kg (216 lb 6.4 oz)   SpO2 92%   BMI 34.95 kg/m²       BP Readings from Last 3 Encounters:   09/12/19 126/84   12/14/18 120/78   10/26/18 120/80     Wt Readings from Last 3 Encounters:   09/12/19 98.2 kg (216 lb 6.4 oz)   12/14/18 96.6 kg (213 lb)   10/26/18 96.2 kg (212 lb)      Body mass index is 34.95 kg/m².    CC:  Main reason(s) for today's visit: Follow-up for diabetes and related medical problems    HPI:     Reviewed chief complaint and details of complaint as documented by staff.     Chronic type 2 diabetes:  Diabetic complications: {diabetes complications (Optional):68454}. Current therapy: {MONA TANNER RXDM:45020}.  Most recent change to treatment plan: {mona tanner dm planchange:50194}.  Home blood sugar levels: {OUSMANE home sugars:23731}.  "  Most recent relevant labs:   Lab Results   Component Value Date    HGBA1C 6.00 (H) 12/14/2018    HGBA1C 6.42 (H) 06/19/2018    HGBA1C 6.45 (H) 12/19/2017    CREATININE 1.2 09/17/2018    LDL 79 12/14/2018    MICROALBUR 5.0 06/19/2018       Chronic essential hypertension:  Since prior visit: {BRIGIDA ROMEO bp at home:04252}.  Most recent in-office blood pressure readings:   BP Readings from Last 3 Encounters:   09/12/19 126/84   12/14/18 120/78   10/26/18 120/80       Chronic hyperlipidemia:  Current therapy include {MONA ROMEO RXCHOLESTEROL:69381}.    Most recent labs:   Lab Results   Component Value Date    LDL 79 12/14/2018    LDL 74 12/19/2017    LDL 78 09/01/2016    HDL 54 12/14/2018    HDL 54 12/19/2017    HDL 49 09/01/2016    TRIG 89 12/14/2018    CHOLHDLRATIO 2.80 12/14/2018    CHOLHDLRATIO 2.78 12/19/2017    CHOLHDLRATIO 2.90 09/01/2016              Patient Care Team:  Alber Gray MD as PCP - General  Justice, Cesar Barkley DO as Consulting Physician (Ophthalmology)  ____________________________________________________________________    REVIEW OF SYSTEMS    Review of Systems  As noted per HPI  Constitutional neg  Resp neg  CV neg      PHYSICAL EXAMINATION    Physical Exam  Constitutional  No distress  Cardiovascular Rate  normal . Rhythm: regular . Heart sounds:  Normal  Pulmonary/Chest: Effort normal and breath sounds normal.    Psychiatric  Alert. Judgment and thought content normal. Mood normal    REVIEWED DATA:    Labs:   Lab Results   Component Value Date     09/17/2018    K 3.5 09/17/2018    CALCIUM 9.5 09/17/2018    AST 27 09/17/2018    ALT 46 09/17/2018    BUN 20 09/17/2018    CREATININE 1.2 09/17/2018    CREATININE 1.04 06/19/2018    CREATININE 1.21 12/19/2017    EGFRIFNONA 76 06/19/2018    EGFRIFAFRI 92 06/19/2018       Lab Results   Component Value Date    HGBA1C 6.00 (H) 12/14/2018    HGBA1C 6.42 (H) 06/19/2018    HGBA1C 6.45 (H) 12/19/2017    MICROALBUR 5.0 06/19/2018       Lab Results   Component  Value Date    LDL 79 12/14/2018    LDL 74 12/19/2017    LDL 78 09/01/2016    HDL 54 12/14/2018    HDL 54 12/19/2017    TRIG 89 12/14/2018    CHOLHDLRATIO 2.80 12/14/2018       Lab Results   Component Value Date    TSH 2.760 04/03/2017    FREET4 1.19 04/03/2017        No results found for: WBC, HGB, PLT     Lab Results   Component Value Date    PROTEIN  09/28/2015      Comment:      Negative    GLUCOSEU Negative 09/17/2018    BLOODU Large (A) 09/17/2018    NITRITEU Negative 09/17/2018    LEUKOCYTESUR TRACE (A) 09/17/2018       Imaging:          Medical Tests:          Summary of old records / correspondence / consultant report:          Request outside records:          ALLERGIES  No Known Allergies     PFSH:     The following portions of the patient's history were reviewed and updated as appropriate: Allergies / Current Medications / Past Medical History / Surgical History / Social History / Family History    PROBLEM LIST   Patient Active Problem List   Diagnosis   • Atopic rhinitis   • Hyperlipidemia   • Hypertension   • Hypogonadism in male   • Obesity (BMI 30-39.9)   • Obstructive sleep apnea syndrome   • Type 2 diabetes mellitus without complication (CMS/HCC)   • Vitamin D deficiency   • Prostate cancer (CMS/HCC)   • DDD (degenerative disc disease), lumbar   • Hydroureteronephrosis   • Ureterolithiasis       PAST MEDICAL HISTORY  Past Medical History:   Diagnosis Date   • Diabetes mellitus (CMS/HCC)    • Hyperlipidemia    • Hypertension    • Hypogonadism in male    • Obesity    • Sleep apnea    • Vitamin D deficiency        SURGICAL HISTORY  Past Surgical History:   Procedure Laterality Date   • ANKLE SURGERY         SOCIAL HISTORY  Social History     Socioeconomic History   • Marital status:      Spouse name: Not on file   • Number of children: Not on file   • Years of education: Not on file   • Highest education level: Not on file   Occupational History   • Occupation: [employer]   Tobacco Use   •  Smoking status: Never Smoker   • Smokeless tobacco: Never Used   Substance and Sexual Activity   • Alcohol use: Yes       FAMILY HISTORY  Family History   Problem Relation Age of Onset   • Parkinsonism Maternal Uncle    • Colonic polyp Paternal Grandfather    • Diabetes Other    • Hyperlipidemia Other    • Hypertension Other    • Thyroid disease Other          **Dragon Disclaimer:   Much of this encounter note is an electronic transcription/translation of spoken language to printed text. The electronic translation of spoken language may permit erroneous, or at times, nonsensical words or phrases to be inadvertently transcribed. Although I have reviewed the note for such errors, some may still exist.       Template created by Derrell Gray MD

## 2019-09-12 NOTE — ASSESSMENT & PLAN NOTE
Check A1c and MA/Cr ratio.   Discussed updating medical therapy plan on follow-up. Discussed GLP-1 and SGLT-2.

## 2019-09-12 NOTE — PROGRESS NOTES
[facility] [place] INTERNAL MEDICINE  ANGIE LAGUNA M.D.      Mega FRANCISCO Sarino / 48 y.o. / male  09/12/2019    CHIEF COMPLAINT     Annual Exam; Diabetes; Hypertension; and Hyperlipidemia      HISTORY OF PRESENT ILLNESS      Reviewed chief complaint and details of complaint as documented by staff.     Mega presents for annual health maintenance visit.  It has been > 6 months since last visit/assessment for active medical problems including the following:     Chronic type 2 diabetes:  Diabetic complications: none identified. Current therapy: Jentadueto.  Most recent change to treatment plan: no recent change.  Home blood sugar levels: about the same as before, has no significant low sugar symptoms/problems.   Most recent relevant labs:   Lab Results   Component Value Date    HGBA1C 6.00 (H) 12/14/2018    HGBA1C 6.42 (H) 06/19/2018    HGBA1C 6.45 (H) 12/19/2017    CREATININE 1.2 09/17/2018    LDL 79 12/14/2018    MICROALBUR 5.0 06/19/2018     Chronic essential hypertension:  Since prior visit: compliant with medication(s) and denies significant problems with medication(s).  Most recent in-office blood pressure readings:   BP Readings from Last 3 Encounters:   09/12/19 126/84   12/14/18 120/78   10/26/18 120/80     Chronic hyperlipidemia:  Current therapy include atorvastatin, denies problems with medication.    Most recent labs:   Lab Results   Component Value Date    LDL 79 12/14/2018    LDL 74 12/19/2017    LDL 78 09/01/2016    HDL 54 12/14/2018    HDL 54 12/19/2017    HDL 49 09/01/2016    TRIG 89 12/14/2018    CHOLHDLRATIO 2.80 12/14/2018    CHOLHDLRATIO 2.78 12/19/2017    CHOLHDLRATIO 2.90 09/01/2016      Sees urologist for prostate cancer under active surveillance, may be having a repeat biopsy.     · Last health maintenance visit: unsure  · General health: multiple medical problems  · Lifestyle:  · Attempting to lose weight?: Yes   · Diet: eats decently  · Exercise: does not exercise  · Tobacco: Never used   · Alcohol:  occasional/rare  · Work: Full-time  · Reproductive health:  · Sexually active?: Yes   · Concern for STD?: No   · Sexual problems?: erectile dysfunction   · Sees Urologist?: Yes for prostate cancer  · Depression Screening:      PHQ-2/PHQ-9 Depression Screening 10/26/2018   Little interest or pleasure in doing things 0   Feeling down, depressed, or hopeless 0   Total Score 0         PHQ-2: 0 (Not depressed)     PHQ-9:     Patient Care Team:  Alber Gray MD as PCP - General  EffinghamCesar DO as Consulting Physician (Ophthalmology)  ______________________________________________________________________    ALLERGIES  No Known Allergies     MEDICATIONS  Current Outpatient Medications   Medication Sig Dispense Refill   • atorvastatin (LIPITOR) 40 MG tablet TAKE 1 TABLET BY MOUTH  DAILY 90 tablet 3   • Cetirizine HCl 10 MG capsule Take 10 mg by mouth daily.     • Cholecalciferol (VITAMIN D) 2000 UNITS capsule Take 1 capsule by mouth.     • JENTADUETO 2.5-1000 MG tablet TAKE 1 TABLET BY MOUTH  TWICE A  tablet 3   • valsartan-hydrochlorothiazide (DIOVAN-HCT) 320-25 MG per tablet TAKE 1 TABLET BY MOUTH  DAILY 90 tablet 3     No current facility-administered medications for this visit.        PFSH:     The following portions of the patient's history were reviewed and updated as appropriate: Allergies / Current Medications / Past Medical History / Surgical History / Social History / Family History    PROBLEM LIST   Patient Active Problem List   Diagnosis   • Atopic rhinitis   • Hyperlipidemia   • Hypertension   • Hypogonadism in male   • Obesity (BMI 30-39.9)   • Obstructive sleep apnea syndrome   • Type 2 diabetes mellitus without complication (CMS/HCC)   • Vitamin D deficiency   • Prostate cancer (CMS/HCC)   • DDD (degenerative disc disease), lumbar   • Hydroureteronephrosis   • Ureterolithiasis       PAST MEDICAL HISTORY  Past Medical History:   Diagnosis Date   • Diabetes mellitus (CMS/HCC)    •  Hyperlipidemia    • Hypertension    • Hypogonadism in male    • Obesity    • Sleep apnea    • Vitamin D deficiency        SURGICAL HISTORY  Past Surgical History:   Procedure Laterality Date   • ANKLE SURGERY         SOCIAL HISTORY  Social History     Socioeconomic History   • Marital status:      Spouse name: Lisa   • Number of children: 2   • Years of education: Not on file   • Highest education level: Not on file   Occupational History   • Occupation: [employer]   Tobacco Use   • Smoking status: Never Smoker   • Smokeless tobacco: Never Used   Substance and Sexual Activity   • Alcohol use: Yes     Comment: occasional    • Drug use: No   • Sexual activity: Yes     Partners: Female   Lifestyle   • Physical activity:     Days per week: 0 days     Minutes per session: Not on file   • Stress: Very much       FAMILY HISTORY  Family History   Problem Relation Age of Onset   • Parkinsonism Maternal Uncle 65   • Colonic polyp Paternal Grandfather    • Prostate cancer Paternal Grandfather 80   • Diabetes type II Paternal Grandfather    • Bipolar disorder Mother    • Hypertension Mother    • Hyperlipidemia Mother    • Diabetes type II Father    • No Known Problems Sister    • Breast cancer Maternal Grandmother    • Hypertension Maternal Grandmother    • Diabetes type II Maternal Grandmother    • No Known Problems Paternal Grandmother    • Hypothyroidism Maternal Aunt    • Colon cancer Neg Hx        IMMUNIZATION HISTORY  Immunization History   Administered Date(s) Administered   • Flu Vaccine Quad PF >36MO 12/19/2017   • Hepatitis A 05/08/2018, 12/14/2018   • Influenza TIV (IM) 10/01/2015   • Pneumococcal Conjugate 13-Valent (PCV13) 04/03/2017   • Pneumococcal Polysaccharide (PPSV23) 12/19/2017   • Td 09/09/2017   • Tdap 09/28/2015   • flucelvax quad pfs =>4 YRS 10/26/2018       ______________________________________________________________________    REVIEW OF SYSTEMS     Review of Systems   Constitutional:  "Negative.    HENT: Negative.    Eyes: Negative.    Respiratory: Negative.    Cardiovascular: Negative.    Gastrointestinal: Negative.    Endocrine: Negative.    Genitourinary: Negative.    Musculoskeletal: Negative.    Skin: Negative.    Allergic/Immunologic: Negative.    Neurological: Negative.    Hematological: Negative.    Psychiatric/Behavioral: Negative.          VITALS     Visit Vitals  /84   Pulse 95   Temp 97.5 °F (36.4 °C)   Ht 167.6 cm (65.98\")   Wt 98.2 kg (216 lb 6.4 oz)   SpO2 92%   BMI 34.95 kg/m²       BP Readings from Last 3 Encounters:   09/12/19 126/84   12/14/18 120/78   10/26/18 120/80     Wt Readings from Last 3 Encounters:   09/12/19 98.2 kg (216 lb 6.4 oz)   12/14/18 96.6 kg (213 lb)   10/26/18 96.2 kg (212 lb)      Body mass index is 34.95 kg/m².    PHYSICAL EXAMINATION     Physical Exam   Constitutional: He is oriented to person, place, and time. He appears well-developed and well-nourished. No distress.   Obese    HENT:   Head: Normocephalic and atraumatic.   Right Ear: External ear normal.   Left Ear: External ear normal.   Nose: Nose normal.   Mouth/Throat: Oropharynx is clear and moist.   Eyes: Conjunctivae and EOM are normal. Pupils are equal, round, and reactive to light. No scleral icterus.   Neck: Normal range of motion. Neck supple. No tracheal deviation present. No thyroid mass and no thyromegaly present.   Cardiovascular: Normal rate, regular rhythm, normal heart sounds and intact distal pulses.   Pulmonary/Chest: Effort normal and breath sounds normal.   Abdominal: Soft. Normal appearance and bowel sounds are normal. He exhibits no distension and no mass. There is no hepatosplenomegaly. There is no tenderness. No hernia.   Protuberant    Genitourinary:   Genitourinary Comments: Deferred to urologist    Musculoskeletal: Normal range of motion.    Mega had a diabetic foot exam performed today.   During the foot exam he had a monofilament test performed " (normal).  Vascular Status -  His right foot exhibits normal foot vasculature . His left foot exhibits normal foot vasculature .  Skin Integrity  -  His right foot skin is intact.His left foot skin is intact..  Lymphadenopathy:     He has no cervical adenopathy.     He has no axillary adenopathy.        Right: No inguinal and no supraclavicular adenopathy present.        Left: No inguinal and no supraclavicular adenopathy present.   Neurological: He is alert and oriented to person, place, and time. He has normal reflexes. No cranial nerve deficit. He exhibits normal muscle tone.   Skin: Skin is warm. No lesion (Negative for suspicious skin lesions/growths) and no rash noted. No pallor.   No jaundice  No suspicious skin lesions.    Psychiatric: He has a normal mood and affect. His behavior is normal. Judgment and thought content normal.         REVIEWED DATA      Labs:    Lab Results   Component Value Date     09/17/2018    K 3.5 09/17/2018    CALCIUM 9.5 09/17/2018    AST 27 09/17/2018    ALT 46 09/17/2018    BUN 20 09/17/2018    CREATININE 1.2 09/17/2018    CREATININE 1.04 06/19/2018    CREATININE 1.21 12/19/2017    EGFRIFNONA 76 06/19/2018    EGFRIFAFRI 92 06/19/2018       Lab Results   Component Value Date     (H) 09/17/2018    HGBA1C 6.00 (H) 12/14/2018    HGBA1C 6.42 (H) 06/19/2018    HGBA1C 6.45 (H) 12/19/2017    TSH 2.760 04/03/2017    FREET4 1.19 04/03/2017       Lab Results   Component Value Date    PSA 2.29 12/15/2014       Lab Results   Component Value Date    LDL 79 12/14/2018    HDL 54 12/14/2018    TRIG 89 12/14/2018    CHOLHDLRATIO 2.80 12/14/2018       No components found for: FVNI150S    No results found for: WBC, HGB, MCV, PLT    Lab Results   Component Value Date    PROTEIN  09/28/2015      Comment:      Negative    GLUCOSEU Negative 09/17/2018    BLOODU Large (A) 09/17/2018    NITRITEU Negative 09/17/2018    LEUKOCYTESUR TRACE (A) 09/17/2018        No results found for:  HEPCVIRUSABY    Imaging:           Medical Tests:       ______________________________________________________________________    ASSESSMENT & PLAN     ANNUAL WELLNESS EXAM / PHYSICAL     Other medical problems addressed today:  Problem List Items Addressed This Visit        High    Type 2 diabetes mellitus without complication (CMS/HCC) (Chronic)    Overview     Stable. Continue Jentadueto 2.5/1000 mg BID.          Current Assessment & Plan     Check A1c and MA/Cr ratio.   Discussed updating medical therapy plan on follow-up. Discussed GLP-1 and SGLT-2.          Relevant Medications    JENTADUETO 2.5-1000 MG tablet    Other Relevant Orders    Hemoglobin A1c    Microalbumin / Creatinine Urine Ratio - Urine, Clean Catch       Medium    Hypertension (Chronic)    Overview     Stable. Continue valsartan/hctz 320/25.          Relevant Medications    valsartan-hydrochlorothiazide (DIOVAN-HCT) 320-25 MG per tablet       Low    Hyperlipidemia (Chronic)    Overview     Stable. Continue atorvastatin 40 mg.         Relevant Medications    atorvastatin (LIPITOR) 40 MG tablet    Other Relevant Orders    Lipid Panel With / Chol / HDL Ratio    Prostate cancer (CMS/HCC) (Chronic)    Overview     *Seen at [facility]: planned 1 year observation, no treatment at this time         Current Assessment & Plan     Continue follow-up with urologist. May be having a repeat bx.            Other Visit Diagnoses     Encounter for annual health examination    -  Primary    Relevant Orders    Ambulatory Referral For Screening Colonoscopy    CBC & Differential    Comprehensive Metabolic Panel    Hemoglobin A1c    Lipid Panel With / Chol / HDL Ratio    TSH+Free T4    Varicella zoster antibody, IgG          Summary/Discussion:     ·     Next Appointment with me: 1/9/2020    Return in about 4 months (around 1/12/2020) for Diabetes and co-morbid conditions.      HEALTHCARE MAINTENANCE ISSUES       Cancer Screening:  · Colon:  Initial/Next screening at age: DUE NOW  · Repeat colon cancer screening: N/A at this time  · Prostate: Has h/o prostate cancer and sees a urologist annually.   · Testicular: Recommended monthly self exam  · Skin: Monthly self skin examination, annual exam by health professional  · Lung: Does not meet criteria for lung cancer screening.   · Other:    Screening Labs & Tests:  · Lab results reviewed & discussed with with patient or orders placed today.  · EKG:  · CV Screening: No special screening needed  · DEXA (75+ or risk factors):   · HEP C (If born [birth year]01 or risk factors): Not indicated    Immunization/Vaccinations (to be given today unless deferred by patient)  · Influenza: Patient plans to get the flu vaccine at pharmacy/work/outside facility  · Hepatitis A: Up to date  · Tetanus/Pertussis: Up to date  · Pneumovax: Up to date  · Shingles: Check Varicella titer   · Other:     Lifestyle Counseling:  · Lifestyle Modifications: Attempt to lose weight, Improve dietary compliance, Begin progressive aerobic exercise program 3-5 days a week, Maintain a low sugar/carbohydrate diet, Follow a low fat, low cholesterol diet, Maintain low sodium diet (< 3 gm) for blood pressure and Manage stress/anxiety better  · Safety Issues: Always wear seatbelt, Avoid texting while driving   · Use sunscreen, regular skin examination  · Recommended annual dental/vision examination.  · Emotional/Stress/Sleep: Reviewed and  given when appropriate      Health Maintenance   Topic Date Due   • COLONOSCOPY  02/22/2016   • HEMOGLOBIN A1C  06/14/2019   • DIABETIC FOOT EXAM  06/19/2019   • URINE MICROALBUMIN  06/19/2019   • INFLUENZA VACCINE  08/01/2019   • LIPID PANEL  12/14/2019   • DIABETIC EYE EXAM  09/03/2020   • ANNUAL PHYSICAL  09/13/2020   • TDAP/TD VACCINES (3 - Td) 09/09/2027   • PNEUMOCOCCAL VACCINE (19-64 MEDIUM RISK)  Completed   • ZOSTER VACCINE  Discontinued         **Chantel Disclaimer:   Much of this encounter note is an  electronic transcription/translation of spoken language to printed text. The electronic translation of spoken language may permit erroneous, or at times, nonsensical words or phrases to be inadvertently transcribed. Although I have reviewed the note for such errors, some may still exist.       Template created by Derrell Gray MD

## 2019-09-12 NOTE — PATIENT INSTRUCTIONS
** IMPORTANT MESSAGE FROM DR. LAGUNA **    In our office, your satisfaction is VERY important to us.     You may receive a survey from [employer] by mail or E-mail for you to provide feedback about your visit. This information is invaluable for me to know what we can do to improve our services.     I ask that you please take a few minutes to complete the survey and let us know how we are doing in serving your needs. (You may receive the survey more than once for multiple visits)    Thank You !    Dr. Laguna & Staff    __________________________________________________________      ADDITIONAL INSTRUCTION / REMINDERS FROM DR. LAGUNA

## 2019-09-13 LAB
ALBUMIN SERPL-MCNC: 5 G/DL (ref 3.5–5.2)
ALBUMIN/CREAT UR: 5.4 MG/G CREAT (ref 0–30)
ALBUMIN/GLOB SERPL: 1.7 G/DL
ALP SERPL-CCNC: 73 U/L (ref 39–117)
ALT SERPL-CCNC: 39 U/L (ref 1–41)
AST SERPL-CCNC: 25 U/L (ref 1–40)
BASOPHILS # BLD AUTO: 0.1 10*3/MM3 (ref 0–0.2)
BASOPHILS NFR BLD AUTO: 1.1 % (ref 0–1.5)
BILIRUB SERPL-MCNC: 0.6 MG/DL (ref 0.2–1.2)
BUN SERPL-MCNC: 15 MG/DL (ref 6–20)
BUN/CREAT SERPL: 14 (ref 7–25)
CALCIUM SERPL-MCNC: 9.8 MG/DL (ref 8.6–10.5)
CHLORIDE SERPL-SCNC: 98 MMOL/L (ref 98–107)
CHOLEST SERPL-MCNC: 136 MG/DL (ref 0–200)
CHOLEST/HDLC SERPL: 2.52 {RATIO}
CO2 SERPL-SCNC: 25.4 MMOL/L (ref 22–29)
CREAT SERPL-MCNC: 1.07 MG/DL (ref 0.76–1.27)
CREAT UR-MCNC: 147.4 MG/DL
EOSINOPHIL # BLD AUTO: 0.16 10*3/MM3 (ref 0–0.4)
EOSINOPHIL NFR BLD AUTO: 1.7 % (ref 0.3–6.2)
ERYTHROCYTE [DISTWIDTH] IN BLOOD BY AUTOMATED COUNT: 12.5 % (ref 12.3–15.4)
GLOBULIN SER CALC-MCNC: 3 GM/DL
GLUCOSE SERPL-MCNC: 130 MG/DL (ref 65–99)
HBA1C MFR BLD: 6.8 % (ref 4.8–5.6)
HCT VFR BLD AUTO: 47.5 % (ref 37.5–51)
HDLC SERPL-MCNC: 54 MG/DL (ref 40–60)
HGB BLD-MCNC: 15.4 G/DL (ref 13–17.7)
IMM GRANULOCYTES # BLD AUTO: 0.03 10*3/MM3 (ref 0–0.05)
IMM GRANULOCYTES NFR BLD AUTO: 0.3 % (ref 0–0.5)
LDLC SERPL CALC-MCNC: 66 MG/DL (ref 0–100)
LYMPHOCYTES # BLD AUTO: 3.49 10*3/MM3 (ref 0.7–3.1)
LYMPHOCYTES NFR BLD AUTO: 37.6 % (ref 19.6–45.3)
MCH RBC QN AUTO: 30.7 PG (ref 26.6–33)
MCHC RBC AUTO-ENTMCNC: 32.4 G/DL (ref 31.5–35.7)
MCV RBC AUTO: 94.6 FL (ref 79–97)
MICROALBUMIN UR-MCNC: 8 UG/ML
MONOCYTES # BLD AUTO: 0.68 10*3/MM3 (ref 0.1–0.9)
MONOCYTES NFR BLD AUTO: 7.3 % (ref 5–12)
NEUTROPHILS # BLD AUTO: 4.83 10*3/MM3 (ref 1.7–7)
NEUTROPHILS NFR BLD AUTO: 52 % (ref 42.7–76)
NRBC BLD AUTO-RTO: 0 /100 WBC (ref 0–0.2)
PLATELET # BLD AUTO: 377 10*3/MM3 (ref 140–450)
POTASSIUM SERPL-SCNC: 3.7 MMOL/L (ref 3.5–5.2)
PROT SERPL-MCNC: 8 G/DL (ref 6–8.5)
RBC # BLD AUTO: 5.02 10*6/MM3 (ref 4.14–5.8)
SODIUM SERPL-SCNC: 140 MMOL/L (ref 136–145)
T4 FREE SERPL-MCNC: 1.49 NG/DL (ref 0.93–1.7)
TRIGL SERPL-MCNC: 82 MG/DL (ref 0–150)
TSH SERPL DL<=0.005 MIU/L-ACNC: 1.9 UIU/ML (ref 0.27–4.2)
VLDLC SERPL CALC-MCNC: 16.4 MG/DL
VZV IGG SER IA-ACNC: <135 INDEX
WBC # BLD AUTO: 9.29 10*3/MM3 (ref 3.4–10.8)

## 2019-09-13 NOTE — PROGRESS NOTES
Aishwarya:    Mega, here are the result(s) of your test(s):     Diabetes is little higher than last time. We will start new therapy plan for your diabetes on your next visit.   Cholesterol is stable.   Thyroid is fine.   Other labs were fine.     You do NOT have immunity against chicken pox. You may want to consider getting the chicken pox vaccine at your pharmacy.     Please do not hesitate to contact me if you have questions.

## 2019-12-27 DIAGNOSIS — E78.5 HYPERLIPIDEMIA: ICD-10-CM

## 2019-12-27 RX ORDER — ATORVASTATIN CALCIUM 40 MG/1
TABLET, FILM COATED ORAL
Qty: 90 TABLET | Refills: 3 | Status: SHIPPED | OUTPATIENT
Start: 2019-12-27 | End: 2021-01-05 | Stop reason: SDUPTHER

## 2020-01-10 ENCOUNTER — OFFICE VISIT (OUTPATIENT)
Dept: INTERNAL MEDICINE | Age: 50
End: 2020-01-10

## 2020-01-10 VITALS
WEIGHT: 219 LBS | DIASTOLIC BLOOD PRESSURE: 78 MMHG | SYSTOLIC BLOOD PRESSURE: 130 MMHG | HEART RATE: 83 BPM | HEIGHT: 66 IN | TEMPERATURE: 97.6 F | BODY MASS INDEX: 35.2 KG/M2 | OXYGEN SATURATION: 95 %

## 2020-01-10 DIAGNOSIS — E78.2 MIXED HYPERLIPIDEMIA: Chronic | ICD-10-CM

## 2020-01-10 DIAGNOSIS — E11.9 TYPE 2 DIABETES MELLITUS WITHOUT COMPLICATION, WITHOUT LONG-TERM CURRENT USE OF INSULIN (HCC): Primary | Chronic | ICD-10-CM

## 2020-01-10 DIAGNOSIS — E66.9 OBESITY (BMI 30-39.9): Chronic | ICD-10-CM

## 2020-01-10 DIAGNOSIS — I10 ESSENTIAL HYPERTENSION: Chronic | ICD-10-CM

## 2020-01-10 PROCEDURE — 99214 OFFICE O/P EST MOD 30 MIN: CPT | Performed by: INTERNAL MEDICINE

## 2020-01-10 NOTE — ASSESSMENT & PLAN NOTE
BMI Readings from Last 3 Encounters:   01/10/20 35.37 kg/m²   09/12/19 34.95 kg/m²   12/14/18 34.40 kg/m²      Wt Readings from Last 3 Encounters:   01/10/20 99.3 kg (219 lb)   09/12/19 98.2 kg (216 lb 6.4 oz)   12/14/18 96.6 kg (213 lb)      Improve diet/exercise for weight loss. Discussed GLP-1 agonist and SGLT-2 inhibitor for diabetes and weight loss.

## 2020-01-10 NOTE — PROGRESS NOTES
[facility] [place] [facility]  ANGIE LAGUNA M.D.      [place] Santiago / 49 y.o. / male  01/10/2020      CHIEF COMPLAINT     Diabetes; Hypertension; and Hyperlipidemia      ASSESSMENT & PLAN     Problem List Items Addressed This Visit        High    Type 2 diabetes mellitus without complication (CMS/HCC) - Primary (Chronic)    Overview     Continue Jentadueto 2.5/1000 mg BID.          Current Assessment & Plan     Discussed GLP-1 agonist and SGLT-2 inhibitor again today.   Check A1c today. If A1c > 6.4 I will suggest starting one of the two drugs.   He seems to be in agreement.     Lab Results   Component Value Date    HGBA1C 6.80 (H) 09/12/2019    HGBA1C 6.00 (H) 12/14/2018    HGBA1C 6.42 (H) 06/19/2018             Relevant Medications    JENTADUETO 2.5-1000 MG tablet    Other Relevant Orders    Hemoglobin A1c    Microalbumin / Creatinine Urine Ratio - Urine, Clean Catch    Comprehensive Metabolic Panel       Medium    Hyperlipidemia (Chronic)    Overview     Stable. Continue atorvastatin 40 mg.         Relevant Medications    atorvastatin (LIPITOR) 40 MG tablet    Hypertension (Chronic)    Overview     Stable. Continue valsartan/hctz 320/25.          Relevant Medications    valsartan-hydrochlorothiazide (DIOVAN-HCT) 320-25 MG per tablet       Low    Obesity (BMI 30-39.9) (Chronic)    Current Assessment & Plan     BMI Readings from Last 3 Encounters:   01/10/20 35.37 kg/m²   09/12/19 34.95 kg/m²   12/14/18 34.40 kg/m²      Wt Readings from Last 3 Encounters:   01/10/20 99.3 kg (219 lb)   09/12/19 98.2 kg (216 lb 6.4 oz)   12/14/18 96.6 kg (213 lb)      Improve diet/exercise for weight loss. Discussed GLP-1 agonist and SGLT-2 inhibitor for diabetes and weight loss.              Orders Placed This Encounter   Procedures   • Hemoglobin A1c   • Microalbumin / Creatinine Urine Ratio - Urine, Clean Catch   • Comprehensive Metabolic Panel     No orders of the defined types were placed in this  "encounter.      Summary/Discussion:  ·       Next Appointment with me: Visit date not found    Return in about 4 months (around 5/10/2020) for DIABETES.      MEDICATIONS     Current Outpatient Medications   Medication Sig Dispense Refill   • atorvastatin (LIPITOR) 40 MG tablet TAKE 1 TABLET BY MOUTH  DAILY 90 tablet 3   • Cetirizine HCl 10 MG capsule Take 10 mg by mouth daily.     • Cholecalciferol (VITAMIN D) 2000 UNITS capsule Take 1 capsule by mouth.     • JENTADUETO 2.5-1000 MG tablet TAKE 1 TABLET BY MOUTH  TWICE A  tablet 3   • valsartan-hydrochlorothiazide (DIOVAN-HCT) 320-25 MG per tablet TAKE 1 TABLET BY MOUTH  DAILY 90 tablet 3     No current facility-administered medications for this visit.           VITAL SIGNS     Visit Vitals  /78   Pulse 83   Temp 97.6 °F (36.4 °C)   Ht 167.6 cm (65.98\")   Wt 99.3 kg (219 lb)   SpO2 95%   BMI 35.37 kg/m²       BP Readings from Last 3 Encounters:   01/10/20 130/78   09/12/19 126/84   12/14/18 120/78     Wt Readings from Last 3 Encounters:   01/10/20 99.3 kg (219 lb)   09/12/19 98.2 kg (216 lb 6.4 oz)   12/14/18 96.6 kg (213 lb)      Body mass index is 35.37 kg/m².      HISTORY OF PRESENT ILLNESS     Not checking sugars at home for DM 2. On Jentadueto.   Lab Results   Component Value Date    HGBA1C 6.80 (H) 09/12/2019    HGBA1C 6.00 (H) 12/14/2018    HGBA1C 6.42 (H) 06/19/2018     Obesity: mild weight gain, travels lot for work.     Hypertension stable on valsartan HCT     Hyperlipidemia on atorvastatin.     Prostate bx was negative, active surveillance     Patient Care Team:  Alber Gray MD as PCP - General  Cesar Ramos DO as Consulting Physician (Ophthalmology)      REVIEW OF SYSTEMS     Review of Systems  Constitutional neg for weight loss  Resp neg  CV neg  GI neg   neg prostate bx       PHYSICAL EXAMINATION     Physical Exam  Constitutional  No distress, obese   Cardiovascular Rate  normal . Rhythm: regular . Heart sounds:  " Normal  Psychiatric  Alert. Judgment intact. Thought content normal. Mood normal      REVIEWED DATA     Labs:     Lab Results   Component Value Date     09/12/2019    K 3.7 09/12/2019    CALCIUM 9.8 09/12/2019    AST 25 09/12/2019    ALT 39 09/12/2019    BUN 15 09/12/2019    CREATININE 1.07 09/12/2019    CREATININE 1.2 09/17/2018    CREATININE 1.04 06/19/2018    EGFRIFNONA 73 09/12/2019    EGFRIFAFRI 89 09/12/2019       Lab Results   Component Value Date    HGBA1C 6.80 (H) 09/12/2019    HGBA1C 6.00 (H) 12/14/2018    HGBA1C 6.42 (H) 06/19/2018     (H) 09/12/2019     (H) 09/17/2018     (H) 06/19/2018    MICROALBUR 8.0 09/12/2019       Lab Results   Component Value Date    LDL 66 09/12/2019    LDL 79 12/14/2018    LDL 74 12/19/2017    HDL 54 09/12/2019    HDL 54 12/14/2018    HDL 54 12/19/2017    TRIG 82 09/12/2019    TRIG 89 12/14/2018    TRIG 112 12/19/2017    CHOLHDLRATIO 2.52 09/12/2019    CHOLHDLRATIO 2.80 12/14/2018    CHOLHDLRATIO 2.78 12/19/2017       Lab Results   Component Value Date    TSH 1.900 09/12/2019    FREET4 1.49 09/12/2019       Lab Results   Component Value Date    WBC 9.29 09/12/2019    HGB 15.4 09/12/2019    HGB 14.0 09/17/2018     09/12/2019       Lab Results   Component Value Date    PROTEIN  09/28/2015      Comment:      Negative    GLUCOSEU Negative 09/17/2018    BLOODU Large (A) 09/17/2018    NITRITEU Negative 09/17/2018    LEUKOCYTESUR TRACE (A) 09/17/2018       Imaging:         Medical Tests:         Summary of old records / correspondence / consultant report:         Request outside records:         ALLERGIES  No Known Allergies     PFSH:     The following portions of the patient's history were reviewed and updated as appropriate: Allergies / Current Medications / Past Medical History / Surgical History / Social History / Family History    PROBLEM LIST   Patient Active Problem List   Diagnosis   • Atopic rhinitis   • Hyperlipidemia   • Hypertension   •  Hypogonadism in male   • Obesity (BMI 30-39.9)   • Obstructive sleep apnea syndrome   • Type 2 diabetes mellitus without complication (CMS/HCC)   • Vitamin D deficiency   • Prostate cancer (CMS/HCC)   • DDD (degenerative disc disease), lumbar   • Hydroureteronephrosis   • Ureterolithiasis       PAST MEDICAL HISTORY  Past Medical History:   Diagnosis Date   • Diabetes mellitus (CMS/HCC)    • Hyperlipidemia    • Hypertension    • Hypogonadism in male    • Obesity    • Sleep apnea    • Vitamin D deficiency        SURGICAL HISTORY  Past Surgical History:   Procedure Laterality Date   • ANKLE SURGERY         SOCIAL HISTORY  Social History     Socioeconomic History   • Marital status:      Spouse name: Lisa   • Number of children: 2   • Years of education: Not on file   • Highest education level: Not on file   Occupational History   • Occupation: [employer]   Tobacco Use   • Smoking status: Never Smoker   • Smokeless tobacco: Never Used   Substance and Sexual Activity   • Alcohol use: Yes     Comment: occasional    • Drug use: No   • Sexual activity: Yes     Partners: Female   Lifestyle   • Physical activity:     Days per week: 0 days     Minutes per session: Not on file   • Stress: Very much       FAMILY HISTORY  Family History   Problem Relation Age of Onset   • Parkinsonism Maternal Uncle 65   • Colonic polyp Paternal Grandfather    • Prostate cancer Paternal Grandfather 80   • Diabetes type II Paternal Grandfather    • Bipolar disorder Mother    • Hypertension Mother    • Hyperlipidemia Mother    • Diabetes type II Father    • No Known Problems Sister    • Breast cancer Maternal Grandmother    • Hypertension Maternal Grandmother    • Diabetes type II Maternal Grandmother    • No Known Problems Paternal Grandmother    • Hypothyroidism Maternal Aunt    • Colon cancer Neg Hx          **Dragon Disclaimer:   Much of this encounter note is an electronic transcription/translation of spoken language to printed text.  The electronic translation of spoken language may permit erroneous, or at times, nonsensical words or phrases to be inadvertently transcribed. Although I have reviewed the note for such errors, some may still exist.       Template created by Derrell Gray MD

## 2020-01-10 NOTE — ASSESSMENT & PLAN NOTE
Discussed GLP-1 agonist and SGLT-2 inhibitor again today.   Check A1c today. If A1c > 6.4 I will suggest starting one of the two drugs.   He seems to be in agreement.     Lab Results   Component Value Date    HGBA1C 6.80 (H) 09/12/2019    HGBA1C 6.00 (H) 12/14/2018    HGBA1C 6.42 (H) 06/19/2018

## 2020-01-11 LAB
ALBUMIN SERPL-MCNC: 4.7 G/DL (ref 3.5–5.2)
ALBUMIN/CREAT UR: 6.3 MG/G CREAT (ref 0–30)
ALBUMIN/GLOB SERPL: 1.5 G/DL
ALP SERPL-CCNC: 75 U/L (ref 39–117)
ALT SERPL-CCNC: 30 U/L (ref 1–41)
AST SERPL-CCNC: 21 U/L (ref 1–40)
BILIRUB SERPL-MCNC: 0.4 MG/DL (ref 0.2–1.2)
BUN SERPL-MCNC: 16 MG/DL (ref 6–20)
BUN/CREAT SERPL: 14.4 (ref 7–25)
CALCIUM SERPL-MCNC: 9.5 MG/DL (ref 8.6–10.5)
CHLORIDE SERPL-SCNC: 100 MMOL/L (ref 98–107)
CO2 SERPL-SCNC: 26.2 MMOL/L (ref 22–29)
CREAT SERPL-MCNC: 1.11 MG/DL (ref 0.76–1.27)
CREAT UR-MCNC: 209.8 MG/DL
GLOBULIN SER CALC-MCNC: 3.2 GM/DL
GLUCOSE SERPL-MCNC: 89 MG/DL (ref 65–99)
HBA1C MFR BLD: 7 % (ref 4.8–5.6)
MICROALBUMIN UR-MCNC: 13.3 UG/ML
POTASSIUM SERPL-SCNC: 4.1 MMOL/L (ref 3.5–5.2)
PROT SERPL-MCNC: 7.9 G/DL (ref 6–8.5)
SODIUM SERPL-SCNC: 140 MMOL/L (ref 136–145)

## 2020-03-09 DIAGNOSIS — IMO0002 UNCONTROLLED TYPE 2 DIABETES MELLITUS: Chronic | ICD-10-CM

## 2020-03-09 NOTE — TELEPHONE ENCOUNTER
Patient called stating that he needed a refill on: JENTADUETO 2.5-1000 MG tablet (Patient is requesting a 90 day supply!!) Please advise.     [facility] confirmed.     Patient call back: [phone]

## 2020-05-11 ENCOUNTER — OFFICE VISIT (OUTPATIENT)
Dept: INTERNAL MEDICINE | Age: 50
End: 2020-05-11

## 2020-05-11 VITALS
OXYGEN SATURATION: 96 % | HEART RATE: 81 BPM | TEMPERATURE: 98 F | HEIGHT: 66 IN | WEIGHT: 221 LBS | SYSTOLIC BLOOD PRESSURE: 130 MMHG | BODY MASS INDEX: 35.52 KG/M2 | DIASTOLIC BLOOD PRESSURE: 80 MMHG

## 2020-05-11 DIAGNOSIS — E11.9 TYPE 2 DIABETES MELLITUS WITHOUT COMPLICATION, WITHOUT LONG-TERM CURRENT USE OF INSULIN (HCC): Chronic | ICD-10-CM

## 2020-05-11 DIAGNOSIS — E78.2 MIXED HYPERLIPIDEMIA: Chronic | ICD-10-CM

## 2020-05-11 DIAGNOSIS — I10 ESSENTIAL HYPERTENSION: Primary | Chronic | ICD-10-CM

## 2020-05-11 LAB — HBA1C MFR BLD: 6.5 % (ref 4.8–5.6)

## 2020-05-11 PROCEDURE — 99214 OFFICE O/P EST MOD 30 MIN: CPT | Performed by: INTERNAL MEDICINE

## 2020-05-11 NOTE — PATIENT INSTRUCTIONS
** IMPORTANT MESSAGE FROM DR. LAGUNA **    In our office, your satisfaction is VERY important to us.     You may receive a survey from [facility] by mail or E-mail for you to provide feedback about your visit. This information is invaluable for me to know what we can do to improve our services.     I ask that you please take a few minutes to complete the survey and let us know how we are doing in serving your needs. (You may receive the survey more than once for multiple visits)    Thank You !    Dr. Laguna & Staff    _________________________________________________________________________________________________________________________      ** ADDITIONAL INSTRUCTION / REMINDERS FROM DR. LAGUNA **

## 2020-05-11 NOTE — PROGRESS NOTES
"[facility] [place] [facility]  ANGIE LAGUNA M.D.      [place] / 49 y.o. / male  05/11/2020      CHIEF COMPLAINT     Hypertension; Hyperlipidemia; and Diabetes      ASSESSMENT & PLAN     Problem List Items Addressed This Visit        High    Type 2 diabetes mellitus without complication (CMS/HCC) (Chronic)    Overview     Continue Jentadueto 2.5/1000 mg BID.          Current Assessment & Plan     Check A1c today.   Discussed starting SGLT-2 inhibitor / GLP-1 agonist.          Relevant Medications    linaGLIPtin-metFORMIN HCl (JENTADUETO) 2.5-1000 MG tablet    Other Relevant Orders    Hemoglobin A1c       Medium    Hyperlipidemia (Chronic)    Overview     Stable. Continue atorvastatin 40 mg.         Relevant Medications    atorvastatin (LIPITOR) 40 MG tablet    Hypertension - Primary (Chronic)    Overview     Stable. Continue valsartan/hctz 320/25.          Relevant Medications    valsartan-hydrochlorothiazide (DIOVAN-HCT) 320-25 MG per tablet        Orders Placed This Encounter   Procedures   • Hemoglobin A1c     No orders of the defined types were placed in this encounter.      Summary/Discussion:      Next Appointment with me: Visit date not found    Return in about 4 months (around 9/11/2020) for Diabetes.      MEDICATIONS     Current Outpatient Medications   Medication Sig Dispense Refill   • atorvastatin (LIPITOR) 40 MG tablet TAKE 1 TABLET BY MOUTH  DAILY 90 tablet 3   • Cetirizine HCl 10 MG capsule Take 10 mg by mouth daily.     • Cholecalciferol (VITAMIN D) 2000 UNITS capsule Take 1 capsule by mouth.     • linaGLIPtin-metFORMIN HCl (JENTADUETO) 2.5-1000 MG tablet Take 1 tablet by mouth 2 (Two) Times a Day. 180 tablet 3   • valsartan-hydrochlorothiazide (DIOVAN-HCT) 320-25 MG per tablet TAKE 1 TABLET BY MOUTH  DAILY 90 tablet 3     No current facility-administered medications for this visit.           VITAL SIGNS     Visit Vitals  /80   Pulse 81   Temp 98 °F (36.7 °C) (Temporal)   Ht 167.6 cm (65.98\") "   Wt 100 kg (221 lb)   SpO2 96%   BMI 35.69 kg/m²       BP Readings from Last 3 Encounters:   05/11/20 130/80   01/10/20 130/78   09/12/19 126/84     Wt Readings from Last 3 Encounters:   05/11/20 100 kg (221 lb)   01/10/20 99.3 kg (219 lb)   09/12/19 98.2 kg (216 lb 6.4 oz)      Body mass index is 35.69 kg/m².      HISTORY OF PRESENT ILLNESS     DM 2: mild wt gain, not checking sugars regularly. [facility]   Lab Results   Component Value Date    HGBA1C 7.00 (H) 01/10/2020    HGBA1C 6.80 (H) 09/12/2019    HGBA1C 6.00 (H) 12/14/2018      Hypertension remains stable Monday valsartan/hctz     Hyperlipidemia is stable on atorvastatin without problems.     Followed by urologist for prostate.       Patient Care Team:  Alber Gray MD as PCP - General  [place], Cesar Barkley DO as Consulting Physician (Ophthalmology)      REVIEW OF SYSTEMS     Review of Systems  Constitutional neg  Resp neg  CV neg  GI neg   neg       PHYSICAL EXAMINATION     Physical Exam  Constitutional  No distress, obese   Psychiatric  Alert. Judgment intact. Thought content normal. Mood normal      REVIEWED DATA     Labs:     Lab Results   Component Value Date     01/10/2020    K 4.1 01/10/2020    CALCIUM 9.5 01/10/2020    AST 21 01/10/2020    ALT 30 01/10/2020    BUN 16 01/10/2020    CREATININE 1.11 01/10/2020    CREATININE 1.07 09/12/2019    CREATININE 1.2 09/17/2018    EGFRIFNONA 70 01/10/2020    EGFRIFAFRI 85 01/10/2020       Lab Results   Component Value Date    HGBA1C 7.00 (H) 01/10/2020    HGBA1C 6.80 (H) 09/12/2019    HGBA1C 6.00 (H) 12/14/2018    GLU 89 01/10/2020     (H) 09/12/2019     (H) 09/17/2018    MICROALBUR 13.3 01/10/2020       Lab Results   Component Value Date    LDL 66 09/12/2019    LDL 79 12/14/2018    LDL 74 12/19/2017    HDL 54 09/12/2019    HDL 54 12/14/2018    HDL 54 12/19/2017    TRIG 82 09/12/2019    TRIG 89 12/14/2018    TRIG 112 12/19/2017    CHOLHDLRATIO 2.52 09/12/2019    CHOLHDLRATIO 2.80  12/14/2018    CHOLHDLRATIO 2.78 12/19/2017       Lab Results   Component Value Date    TSH 1.900 09/12/2019    FREET4 1.49 09/12/2019       Lab Results   Component Value Date    WBC 9.29 09/12/2019    HGB 15.4 09/12/2019    HGB 14.0 09/17/2018     09/12/2019       Lab Results   Component Value Date    PROTEIN  09/28/2015      Comment:      Negative    GLUCOSEU Negative 09/17/2018    BLOODU Large (A) 09/17/2018    NITRITEU Negative 09/17/2018    LEUKOCYTESUR TRACE (A) 09/17/2018       Imaging:         Medical Tests:         Summary of old records / correspondence / consultant report:         Request outside records:         ALLERGIES  No Known Allergies     PFSH:     The following portions of the patient's history were reviewed and updated as appropriate: Allergies / Current Medications / Past Medical History / Surgical History / Social History / Family History    PROBLEM LIST   Patient Active Problem List   Diagnosis   • Atopic rhinitis   • Hyperlipidemia   • Hypertension   • Hypogonadism in male   • Obesity (BMI 30-39.9)   • Obstructive sleep apnea syndrome   • Type 2 diabetes mellitus without complication (CMS/HCC)   • Vitamin D deficiency   • Prostate cancer (CMS/HCC)   • DDD (degenerative disc disease), lumbar   • Hydroureteronephrosis   • Ureterolithiasis       PAST MEDICAL HISTORY  Past Medical History:   Diagnosis Date   • Diabetes mellitus (CMS/HCC)    • Hyperlipidemia    • Hypertension    • Hypogonadism in male    • Obesity    • Sleep apnea    • Vitamin D deficiency        SURGICAL HISTORY  Past Surgical History:   Procedure Laterality Date   • ANKLE SURGERY         SOCIAL HISTORY  Social History     Socioeconomic History   • Marital status:      Spouse name: Lisa   • Number of children: 2   • Years of education: Not on file   • Highest education level: Not on file   Occupational History   • Occupation: [employer]   Tobacco Use   • Smoking status: Never Smoker   • Smokeless tobacco: Never  Used   Substance and Sexual Activity   • Alcohol use: Yes     Comment: occasional    • Drug use: No   • Sexual activity: Yes     Partners: Female   Lifestyle   • Physical activity:     Days per week: 0 days     Minutes per session: Not on file   • Stress: Very much       FAMILY HISTORY  Family History   Problem Relation Age of Onset   • Parkinsonism Maternal Uncle 65   • Colonic polyp Paternal Grandfather    • Prostate cancer Paternal Grandfather 80   • Diabetes type II Paternal Grandfather    • Bipolar disorder Mother    • Hypertension Mother    • Hyperlipidemia Mother    • Diabetes type II Father    • No Known Problems Sister    • Breast cancer Maternal Grandmother    • Hypertension Maternal Grandmother    • Diabetes type II Maternal Grandmother    • No Known Problems Paternal Grandmother    • Hypothyroidism Maternal Aunt    • Colon cancer Neg Hx          **Dragon Disclaimer:   Much of this encounter note is an electronic transcription/translation of spoken language to printed text. The electronic translation of spoken language may permit erroneous, or at times, nonsensical words or phrases to be inadvertently transcribed. Although I have reviewed the note for such errors, some may still exist.     Template created by Derrell Gray MD

## 2020-09-11 ENCOUNTER — OFFICE VISIT (OUTPATIENT)
Dept: INTERNAL MEDICINE | Age: 50
End: 2020-09-11

## 2020-09-11 VITALS
HEIGHT: 66 IN | SYSTOLIC BLOOD PRESSURE: 130 MMHG | WEIGHT: 226 LBS | DIASTOLIC BLOOD PRESSURE: 78 MMHG | OXYGEN SATURATION: 98 % | TEMPERATURE: 97.5 F | HEART RATE: 91 BPM | BODY MASS INDEX: 36.32 KG/M2

## 2020-09-11 DIAGNOSIS — E78.2 MIXED HYPERLIPIDEMIA: Chronic | ICD-10-CM

## 2020-09-11 DIAGNOSIS — E11.9 TYPE 2 DIABETES MELLITUS WITHOUT COMPLICATION, WITHOUT LONG-TERM CURRENT USE OF INSULIN (HCC): Primary | Chronic | ICD-10-CM

## 2020-09-11 LAB
HBA1C MFR BLD: 7.5 % (ref 4.8–5.6)
Lab: NORMAL

## 2020-09-11 PROCEDURE — 90471 IMMUNIZATION ADMIN: CPT | Performed by: INTERNAL MEDICINE

## 2020-09-11 PROCEDURE — 90686 IIV4 VACC NO PRSV 0.5 ML IM: CPT | Performed by: INTERNAL MEDICINE

## 2020-09-11 PROCEDURE — 99214 OFFICE O/P EST MOD 30 MIN: CPT | Performed by: INTERNAL MEDICINE

## 2020-09-11 RX ORDER — METFORMIN HYDROCHLORIDE 500 MG/1
1000 TABLET, EXTENDED RELEASE ORAL 2 TIMES DAILY
Qty: 120 TABLET | Refills: 3 | Status: SHIPPED | OUTPATIENT
Start: 2020-09-11 | End: 2021-10-12

## 2020-09-11 RX ORDER — METFORMIN HYDROCHLORIDE 500 MG/1
1000 TABLET, EXTENDED RELEASE ORAL 2 TIMES DAILY
Qty: 120 TABLET | Refills: 3 | Status: SHIPPED | OUTPATIENT
Start: 2020-09-11 | End: 2020-09-11 | Stop reason: SDUPTHER

## 2020-09-11 RX ORDER — METFORMIN HYDROCHLORIDE 500 MG/1
500 TABLET, EXTENDED RELEASE ORAL
Qty: 120 TABLET | Refills: 3 | Status: SHIPPED | OUTPATIENT
Start: 2020-09-11 | End: 2020-09-11

## 2020-09-11 NOTE — ASSESSMENT & PLAN NOTE
Start Ozempic ramp-up (sample/education/demonstration provided).   Discontinue Jentadueto.   Start metformin  mg two BID.  Sampled Tradjenta 5 mg take 1/2 tab daily.   Follow-up in 3 months.

## 2020-09-11 NOTE — PROGRESS NOTES
"[facility]  ANGIE LAGUNA M.D.      Mega Santiago / 49 y.o. / male  09/11/2020      VITAL SIGNS     Visit Vitals  /78   Pulse 91   Temp 97.5 °F (36.4 °C)   Ht 167.6 cm (65.98\")   Wt 103 kg (226 lb)   SpO2 98%   BMI 36.50 kg/m²       BP Readings from Last 3 Encounters:   09/11/20 130/78   05/11/20 130/80   01/10/20 130/78     Wt Readings from Last 3 Encounters:   09/11/20 103 kg (226 lb)   05/11/20 100 kg (221 lb)   01/10/20 99.3 kg (219 lb)     Body mass index is 36.5 kg/m².      MEDICATIONS     Current Outpatient Medications   Medication Sig Dispense Refill   • atorvastatin (LIPITOR) 40 MG tablet TAKE 1 TABLET BY MOUTH  DAILY 90 tablet 3   • Cetirizine HCl 10 MG capsule Take 10 mg by mouth daily.     • Cholecalciferol (VITAMIN D) 2000 UNITS capsule Take 1 capsule by mouth.     • valsartan-hydrochlorothiazide (DIOVAN-HCT) 320-25 MG per tablet TAKE 1 TABLET BY MOUTH  DAILY 90 tablet 3   Jentadueto 2.5 / 1000 mg two daily      CHIEF COMPLAINT     Diabetes      ASSESSMENT & PLAN     Problem List Items Addressed This Visit        High    Type 2 diabetes mellitus without complication (CMS/HCC) - Primary (Chronic)    Current Assessment & Plan     Start Ozempic ramp-up (sample/education/demonstration provided).   Discontinue Jentadueto.   Start metformin  mg two BID.  Sampled Tradjenta 5 mg take 1/2 tab daily.   Follow-up in 3 months.          Relevant Medications    Semaglutide,0.25 or 0.5MG/DOS, (Ozempic, 0.25 or 0.5 MG/DOSE,) 2 MG/1.5ML solution pen-injector    linagliptin (Tradjenta) 5 MG tablet tablet    metFORMIN ER (GLUCOPHAGE-XR) 500 MG 24 hr tablet    Other Relevant Orders    Hemoglobin A1c       Medium    Hyperlipidemia (Chronic)    Overview     Continue atorvastatin 40 mg qd.          Relevant Medications    atorvastatin (LIPITOR) 40 MG tablet    Other Relevant Orders    Lipid Panel With / Chol / HDL Ratio        Orders Placed This Encounter   Procedures   • Fluarix Quad >6 Months (VFC) " ([id number])   • Hemoglobin A1c   • Lipid Panel With / Chol / HDL Ratio     New Medications Ordered This Visit   Medications   • Semaglutide,0.25 or 0.5MG/DOS, (Ozempic, 0.25 or 0.5 MG/DOSE,) 2 MG/1.5ML solution pen-injector     Sig: Inject 0.25 mg under the skin into the appropriate area as directed 1 (One) Time Per Week.     Dispense:  1 pen     Refill:  0   • linagliptin (Tradjenta) 5 MG tablet tablet     Sig: Take 0.5 tablets by mouth Daily With Breakfast.     Dispense:  28 tablet     Refill:  0   • metFORMIN ER (GLUCOPHAGE-XR) 500 MG 24 hr tablet     Sig: Take 2 tablets by mouth 2 (two) times a day.     Dispense:  120 tablet     Refill:  3       Summary/Discussion:  •     Next Appointment with me: Visit date not found    Return in about 3 months (around 12/11/2020) for Diabetes.    _____________________________________________________________________________________    HISTORY OF PRESENT ILLNESS     Chronic type 2 diabetes:  Weight gain noted. Diabetic complications: none identified. Current therapy: Jentadueto.  Most recent change to treatment plan: no recent change.  Home blood sugar levels: about the same as before.   Most recent relevant labs:   Lab Results   Component Value Date    HGBA1C 6.50 (H) 05/11/2020    HGBA1C 7.00 (H) 01/10/2020    HGBA1C 6.80 (H) 09/12/2019    CREATININE 1.11 01/10/2020    LDL 66 09/12/2019    MICROALBUR 13.3 01/10/2020      Chronic essential hypertension:  Since prior visit: compliant with medication(s) and denies significant problems with medication(s).  Most recent in-office blood pressure readings:   BP Readings from Last 3 Encounters:   09/11/20 130/78   05/11/20 130/80   01/10/20 130/78     Chronic hyperlipidemia:  Current therapy include atorvastatin, denies problems with medication.    Most recent labs:   Lab Results   Component Value Date    LDL 66 09/12/2019    LDL 79 12/14/2018    LDL 74 12/19/2017    HDL 54 09/12/2019    HDL 54 12/14/2018    HDL 54 12/19/2017    TRIG  82 09/12/2019    CHOLHDLRATIO 2.52 09/12/2019    CHOLHDLRATIO 2.80 12/14/2018    CHOLHDLRATIO 2.78 12/19/2017          Patient Care Team:  Alber Gray MD as PCP - General  Justice, Cesar Barkley DO as Consulting Physician (Ophthalmology)      REVIEW OF SYSTEMS     Review of Systems  Constitutional neg except per HPI   Resp neg  CV neg   GI neg       PHYSICAL EXAMINATION     Physical Exam  Constitutional  No distress, obese   Psychiatric  Alert. Judgment and thought content normal. Mood normal     REVIEWED DATA     Labs:     Lab Results   Component Value Date     01/10/2020    K 4.1 01/10/2020    CALCIUM 9.5 01/10/2020    AST 21 01/10/2020    ALT 30 01/10/2020    BUN 16 01/10/2020    CREATININE 1.11 01/10/2020    CREATININE 1.07 09/12/2019    CREATININE 1.2 09/17/2018    EGFRIFNONA 70 01/10/2020    EGFRIFAFRI 85 01/10/2020       Lab Results   Component Value Date    HGBA1C 6.50 (H) 05/11/2020    HGBA1C 7.00 (H) 01/10/2020    HGBA1C 6.80 (H) 09/12/2019    GLU 89 01/10/2020     (H) 09/12/2019     (H) 09/17/2018    MICROALBUR 13.3 01/10/2020       Lab Results   Component Value Date    LDL 66 09/12/2019    LDL 79 12/14/2018    LDL 74 12/19/2017    HDL 54 09/12/2019    HDL 54 12/14/2018    HDL 54 12/19/2017    TRIG 82 09/12/2019    TRIG 89 12/14/2018    TRIG 112 12/19/2017    CHOLHDLRATIO 2.52 09/12/2019    CHOLHDLRATIO 2.80 12/14/2018    CHOLHDLRATIO 2.78 12/19/2017       Lab Results   Component Value Date    TSH 1.900 09/12/2019    FREET4 1.49 09/12/2019       Lab Results   Component Value Date    WBC 9.29 09/12/2019    HGB 15.4 09/12/2019    HGB 14.0 09/17/2018     09/12/2019       Lab Results   Component Value Date    PROTEIN  09/28/2015      Comment:      Negative    GLUCOSEU Negative 09/17/2018    BLOODU Large (A) 09/17/2018    NITRITEU Negative 09/17/2018    LEUKOCYTESUR TRACE (A) 09/17/2018       Imaging:         Medical Tests:         Summary of old records / correspondence /  consultant report:         Request outside records:         ALLERGIES  No Known Allergies     PFSH:     The following portions of the patient's history were reviewed and updated as appropriate: Allergies / Current Medications / Past Medical History / Surgical History / Social History / Family History    PROBLEM LIST   Patient Active Problem List   Diagnosis   • Atopic rhinitis   • Hyperlipidemia   • Hypertension   • Hypogonadism in male   • Obesity (BMI 30-39.9)   • Obstructive sleep apnea syndrome   • Type 2 diabetes mellitus without complication (CMS/[facility])   • Vitamin D deficiency   • Prostate cancer (CMS/[facility])   • DDD (degenerative disc disease), lumbar   • Hydroureteronephrosis   • Ureterolithiasis       PAST MEDICAL HISTORY  Past Medical History:   Diagnosis Date   • Diabetes mellitus (CMS/HCC)    • Hyperlipidemia    • Hypertension    • Hypogonadism in male    • Obesity    • Sleep apnea    • Vitamin D deficiency        SURGICAL HISTORY  Past Surgical History:   Procedure Laterality Date   • ANKLE SURGERY         SOCIAL HISTORY  Social History     Socioeconomic History   • Marital status:      Spouse name: Lisa   • Number of children: 2   • Years of education: Not on file   • Highest education level: Not on file   Occupational History   • Occupation: [employer]   Tobacco Use   • Smoking status: Never Smoker   • Smokeless tobacco: Never Used   Substance and Sexual Activity   • Alcohol use: Yes     Comment: occasional    • Drug use: No   • Sexual activity: Yes     Partners: Female   Lifestyle   • Physical activity:     Days per week: 0 days     Minutes per session: Not on file   • Stress: Very much       FAMILY HISTORY  Family History   Problem Relation Age of Onset   • Parkinsonism Maternal Uncle 65   • Colonic polyp Paternal Grandfather    • Prostate cancer Paternal Grandfather 80   • Diabetes type II Paternal Grandfather    • Bipolar disorder Mother    • Hypertension Mother    • Hyperlipidemia Mother     • Diabetes type II Father    • No Known Problems Sister    • Breast cancer Maternal Grandmother    • Hypertension Maternal Grandmother    • Diabetes type II Maternal Grandmother    • No Known Problems Paternal Grandmother    • Hypothyroidism Maternal Aunt    • Colon cancer Neg Hx          Examiner was wearing KN95 mask, face shield and exam gloves during the entire duration of the visit. Patient was masked the entire time.   Minimum social distance of 6 ft maintained entire visit except if physical contact was necessary as documented.     **Dragon Disclaimer:   Much of this encounter note is an electronic transcription/translation of spoken language to printed text. The electronic translation of spoken language may permit erroneous, or at times, nonsensical words or phrases to be inadvertently transcribed. Although I have reviewed the note for such errors, some may still exist.     Template created by Derrell Gray MD

## 2020-09-12 NOTE — PROGRESS NOTES
MyChart:    Mega, here are the result(s) of your test(s):     A1c for average glucose level is significantly higher at 7.5.   Start Ozempic as we discussed today.     Please do not hesitate to contact me if you have questions.

## 2020-09-17 DIAGNOSIS — I10 ESSENTIAL HYPERTENSION: ICD-10-CM

## 2020-09-17 RX ORDER — VALSARTAN AND HYDROCHLOROTHIAZIDE 320; 25 MG/1; MG/1
1 TABLET, FILM COATED ORAL DAILY
Qty: 90 TABLET | Refills: 1 | Status: SHIPPED | OUTPATIENT
Start: 2020-09-17 | End: 2021-04-19 | Stop reason: SDUPTHER

## 2020-12-14 DIAGNOSIS — E11.9 TYPE 2 DIABETES MELLITUS WITHOUT COMPLICATION, WITHOUT LONG-TERM CURRENT USE OF INSULIN (HCC): Chronic | ICD-10-CM

## 2020-12-14 RX ORDER — SEMAGLUTIDE 1.34 MG/ML
0.5 INJECTION, SOLUTION SUBCUTANEOUS WEEKLY
Qty: 1 PEN | Refills: 2 | Status: SHIPPED | OUTPATIENT
Start: 2020-12-14 | End: 2021-03-12

## 2020-12-31 ENCOUNTER — OFFICE VISIT (OUTPATIENT)
Dept: INTERNAL MEDICINE | Age: 50
End: 2020-12-31

## 2020-12-31 VITALS
HEART RATE: 90 BPM | OXYGEN SATURATION: 96 % | WEIGHT: 217 LBS | HEIGHT: 66 IN | BODY MASS INDEX: 34.87 KG/M2 | DIASTOLIC BLOOD PRESSURE: 80 MMHG | TEMPERATURE: 97.5 F | SYSTOLIC BLOOD PRESSURE: 122 MMHG

## 2020-12-31 DIAGNOSIS — E78.2 MIXED HYPERLIPIDEMIA: Chronic | ICD-10-CM

## 2020-12-31 DIAGNOSIS — E11.9 TYPE 2 DIABETES MELLITUS WITHOUT COMPLICATION, WITHOUT LONG-TERM CURRENT USE OF INSULIN (HCC): Primary | Chronic | ICD-10-CM

## 2020-12-31 DIAGNOSIS — I10 ESSENTIAL HYPERTENSION: Chronic | ICD-10-CM

## 2020-12-31 DIAGNOSIS — Z11.59 NEED FOR HEPATITIS C SCREENING TEST: ICD-10-CM

## 2020-12-31 DIAGNOSIS — Z12.11 SCREENING FOR COLON CANCER: ICD-10-CM

## 2020-12-31 LAB
ALBUMIN SERPL-MCNC: 4.5 G/DL (ref 3.5–5.2)
ALBUMIN/GLOB SERPL: 1.6 G/DL
ALP SERPL-CCNC: 86 U/L (ref 39–117)
ALT SERPL-CCNC: 35 U/L (ref 1–41)
AST SERPL-CCNC: 20 U/L (ref 1–40)
BILIRUB SERPL-MCNC: 0.7 MG/DL (ref 0–1.2)
BUN SERPL-MCNC: 18 MG/DL (ref 6–20)
BUN/CREAT SERPL: 15.3 (ref 7–25)
CALCIUM SERPL-MCNC: 9.6 MG/DL (ref 8.6–10.5)
CHLORIDE SERPL-SCNC: 101 MMOL/L (ref 98–107)
CHOLEST SERPL-MCNC: 116 MG/DL (ref 0–200)
CHOLEST/HDLC SERPL: 2.32 {RATIO}
CO2 SERPL-SCNC: 28.7 MMOL/L (ref 22–29)
CREAT SERPL-MCNC: 1.18 MG/DL (ref 0.76–1.27)
GLOBULIN SER CALC-MCNC: 2.9 GM/DL
GLUCOSE SERPL-MCNC: 97 MG/DL (ref 65–99)
HBA1C MFR BLD: 8 % (ref 4.8–5.6)
HDLC SERPL-MCNC: 50 MG/DL (ref 40–60)
LDLC SERPL CALC-MCNC: 53 MG/DL (ref 0–100)
POTASSIUM SERPL-SCNC: 3.7 MMOL/L (ref 3.5–5.2)
PROT SERPL-MCNC: 7.4 G/DL (ref 6–8.5)
SODIUM SERPL-SCNC: 140 MMOL/L (ref 136–145)
TRIGL SERPL-MCNC: 62 MG/DL (ref 0–150)
VLDLC SERPL CALC-MCNC: 13 MG/DL (ref 5–40)

## 2020-12-31 PROCEDURE — 90471 IMMUNIZATION ADMIN: CPT | Performed by: INTERNAL MEDICINE

## 2020-12-31 PROCEDURE — 90746 HEPB VACCINE 3 DOSE ADULT IM: CPT | Performed by: INTERNAL MEDICINE

## 2020-12-31 PROCEDURE — 99214 OFFICE O/P EST MOD 30 MIN: CPT | Performed by: INTERNAL MEDICINE

## 2020-12-31 NOTE — PROGRESS NOTES
"    I N T E R N A L  M E D I C I N E  J U N O H  K I M  M D      ENCOUNTER DATE:  12/31/2020    Mega Santiago / 50 y.o. / male      CHIEF COMPLAINT / REASON FOR OFFICE VISIT     Diabetes      ASSESSMENT & PLAN     Problem List Items Addressed This Visit     Type 2 diabetes mellitus without complication (CMS/HCC) - Primary (Chronic)    Relevant Medications    metFORMIN ER (GLUCOPHAGE-XR) 500 MG 24 hr tablet    Semaglutide,0.25 or 0.5MG/DOS, (Ozempic, 0.25 or 0.5 MG/DOSE,) 2 MG/1.5ML solution pen-injector    Other Relevant Orders    Hemoglobin A1c    Hyperlipidemia (Chronic)    Overview     Continue atorvastatin 40 mg qd.          Relevant Medications    atorvastatin (LIPITOR) 40 MG tablet    Other Relevant Orders    Lipid Panel With / Chol / HDL Ratio    Comprehensive Metabolic Panel    Hypertension (Chronic)    Overview     Continue valsartan/hctz 320/25.          Relevant Medications    valsartan-hydrochlorothiazide (DIOVAN-HCT) 320-25 MG per tablet      Other Visit Diagnoses     Screening for colon cancer        Need for hepatitis C screening test        Relevant Orders    Hepatitis C Antibody        Orders Placed This Encounter   Procedures   • Hepatitis B Vaccine Adult IM   • Hemoglobin A1c   • Lipid Panel With / Chol / HDL Ratio   • Comprehensive Metabolic Panel   • Hepatitis C Antibody     No orders of the defined types were placed in this encounter.      SUMMARY/DISCUSSION  • Doing well with wt loss. Check labs.       Next Appointment with me: Visit date not found    Return in about 4 months (around 4/30/2021) for Diabetes, Hypertension.      VITAL SIGNS     Visit Vitals  /80   Pulse 90   Temp 97.5 °F (36.4 °C)   Ht 167.6 cm (65.98\")   Wt 98.4 kg (217 lb)   SpO2 96%   BMI 35.05 kg/m²       BP Readings from Last 3 Encounters:   12/31/20 122/80   09/11/20 130/78   05/11/20 130/80     Wt Readings from Last 3 Encounters:   12/31/20 98.4 kg (217 lb)   09/11/20 103 kg (226 lb)   05/11/20 100 kg (221 lb) "     Body mass index is 35.05 kg/m².        HISTORY OF PRESENT ILLNESS     Doing well with Ozempic up to 0.5 mg now. No significant side effects. Eating less. Blood sugar better.     Hypertension stable on medication    Hyperlipidemia on atorvastatin without problems         REVIEW OF SYSTEMS     Review of Systems  Wt loss  No nausea/vomiting or abdominal pain  No chest pain       PHYSICAL EXAMINATION     Physical Exam  Decreased wt  Psych: Normal mood and affect. Alert and intact judgment.       REVIEWED DATA     Labs:     Lab Results   Component Value Date     01/10/2020    K 4.1 01/10/2020    CALCIUM 9.5 01/10/2020    AST 21 01/10/2020    ALT 30 01/10/2020    BUN 16 01/10/2020    CREATININE 1.11 01/10/2020    CREATININE 1.07 09/12/2019    CREATININE 1.2 09/17/2018    EGFRIFNONA 70 01/10/2020    EGFRIFAFRI 85 01/10/2020       Lab Results   Component Value Date    HGBA1C 7.50 (H) 09/11/2020    HGBA1C 6.50 (H) 05/11/2020    HGBA1C 7.00 (H) 01/10/2020    GLU 89 01/10/2020     (H) 09/12/2019     (H) 09/17/2018    MICROALBUR 13.3 01/10/2020       Lab Results   Component Value Date    LDL 66 09/12/2019    LDL 79 12/14/2018    LDL 74 12/19/2017    HDL 54 09/12/2019    HDL 54 12/14/2018    HDL 54 12/19/2017    TRIG 82 09/12/2019    TRIG 89 12/14/2018    TRIG 112 12/19/2017    CHOLHDLRATIO 2.52 09/12/2019    CHOLHDLRATIO 2.80 12/14/2018    CHOLHDLRATIO 2.78 12/19/2017       Lab Results   Component Value Date    TSH 1.900 09/12/2019    FREET4 1.49 09/12/2019       Lab Results   Component Value Date    WBC 9.29 09/12/2019    HGB 15.4 09/12/2019    HGB 14.0 09/17/2018     09/12/2019       Lab Results   Component Value Date    PROTEIN  09/28/2015      Comment:      Negative    GLUCOSEU Negative 09/17/2018    BLOODU Large (A) 09/17/2018    NITRITEU Negative 09/17/2018    LEUKOCYTESUR TRACE (A) 09/17/2018           Imaging:           Medical Tests:           Summary of old records / correspondence /  consultant report:           Request outside records:           MEDICATIONS AT THE TIME OF OFFICE VISIT     Current Outpatient Medications   Medication Sig Dispense Refill   • atorvastatin (LIPITOR) 40 MG tablet TAKE 1 TABLET BY MOUTH  DAILY 90 tablet 3   • Cetirizine HCl 10 MG capsule Take 10 mg by mouth daily.     • Cholecalciferol (VITAMIN D) 2000 UNITS capsule Take 1 capsule by mouth.     • metFORMIN ER (GLUCOPHAGE-XR) 500 MG 24 hr tablet Take 2 tablets by mouth 2 (two) times a day. 120 tablet 3   • Semaglutide,0.25 or 0.5MG/DOS, (Ozempic, 0.25 or 0.5 MG/DOSE,) 2 MG/1.5ML solution pen-injector Inject 0.5 mg under the skin into the appropriate area as directed 1 (One) Time Per Week. 1 pen 2   • valsartan-hydrochlorothiazide (DIOVAN-HCT) 320-25 MG per tablet TAKE 1 TABLET BY MOUTH DAILY 90 tablet 1     No current facility-administered medications for this visit.            *Examiner was wearing KN95 mask, face shield and exam gloves during the entire duration of the visit. Patient was masked the entire time.   Minimum social distance of 6 ft maintained entire visit except if physical contact was necessary as documented.     **Dragon Disclaimer:   Much of this encounter note is an electronic transcription/translation of spoken language to printed text. The electronic translation of spoken language may permit erroneous, or at times, nonsensical words or phrases to be inadvertently transcribed. Although I have reviewed the note for such errors, some may still exist.     Template created by Derrell Gray MD

## 2021-01-01 LAB — HCV AB S/CO SERPL IA: <0.1 S/CO RATIO (ref 0–0.9)

## 2021-01-04 ENCOUNTER — DOCUMENTATION (OUTPATIENT)
Dept: INTERNAL MEDICINE | Age: 51
End: 2021-01-04

## 2021-01-04 NOTE — ASSESSMENT & PLAN NOTE
Start Farxiga 5 mg qAM.     Lab Results   Component Value Date    HGBA1C 8.00 (H) 12/31/2020    HGBA1C 7.50 (H) 09/11/2020    HGBA1C 6.50 (H) 05/11/2020

## 2021-01-05 DIAGNOSIS — E78.5 HYPERLIPIDEMIA: ICD-10-CM

## 2021-01-05 RX ORDER — ATORVASTATIN CALCIUM 40 MG/1
40 TABLET, FILM COATED ORAL DAILY
Qty: 90 TABLET | Refills: 3 | Status: SHIPPED | OUTPATIENT
Start: 2021-01-05 | End: 2022-01-14

## 2021-01-29 ENCOUNTER — CLINICAL SUPPORT (OUTPATIENT)
Dept: INTERNAL MEDICINE | Age: 51
End: 2021-01-29

## 2021-01-29 DIAGNOSIS — Z23 NEED FOR HEPATITIS B VACCINATION: Primary | ICD-10-CM

## 2021-01-29 PROCEDURE — 90746 HEPB VACCINE 3 DOSE ADULT IM: CPT | Performed by: INTERNAL MEDICINE

## 2021-01-29 PROCEDURE — 90471 IMMUNIZATION ADMIN: CPT | Performed by: INTERNAL MEDICINE

## 2021-03-12 DIAGNOSIS — E11.9 TYPE 2 DIABETES MELLITUS WITHOUT COMPLICATION, WITHOUT LONG-TERM CURRENT USE OF INSULIN (HCC): Chronic | ICD-10-CM

## 2021-03-12 RX ORDER — SEMAGLUTIDE 1.34 MG/ML
INJECTION, SOLUTION SUBCUTANEOUS
Qty: 2 PEN | Refills: 3 | Status: SHIPPED | OUTPATIENT
Start: 2021-03-12 | End: 2021-09-27 | Stop reason: SDUPTHER

## 2021-04-19 DIAGNOSIS — I10 ESSENTIAL HYPERTENSION: ICD-10-CM

## 2021-04-19 RX ORDER — VALSARTAN AND HYDROCHLOROTHIAZIDE 320; 25 MG/1; MG/1
1 TABLET, FILM COATED ORAL DAILY
Qty: 90 TABLET | Refills: 3 | Status: SHIPPED | OUTPATIENT
Start: 2021-04-19 | End: 2021-09-27 | Stop reason: DRUGHIGH

## 2021-04-27 ENCOUNTER — OFFICE VISIT (OUTPATIENT)
Dept: INTERNAL MEDICINE | Age: 51
End: 2021-04-27

## 2021-04-27 VITALS
TEMPERATURE: 97.1 F | SYSTOLIC BLOOD PRESSURE: 124 MMHG | WEIGHT: 207 LBS | HEIGHT: 66 IN | BODY MASS INDEX: 33.27 KG/M2 | HEART RATE: 76 BPM | DIASTOLIC BLOOD PRESSURE: 80 MMHG | OXYGEN SATURATION: 97 %

## 2021-04-27 DIAGNOSIS — I10 ESSENTIAL HYPERTENSION: Chronic | ICD-10-CM

## 2021-04-27 DIAGNOSIS — E78.2 MIXED HYPERLIPIDEMIA: Chronic | ICD-10-CM

## 2021-04-27 DIAGNOSIS — E11.9 TYPE 2 DIABETES MELLITUS WITHOUT COMPLICATION, WITHOUT LONG-TERM CURRENT USE OF INSULIN (HCC): Primary | Chronic | ICD-10-CM

## 2021-04-27 DIAGNOSIS — R19.06 EPIGASTRIC SWELLING OR MASS OR LUMP: ICD-10-CM

## 2021-04-27 LAB — HBA1C MFR BLD: 6.1 %

## 2021-04-27 PROCEDURE — 99214 OFFICE O/P EST MOD 30 MIN: CPT | Performed by: INTERNAL MEDICINE

## 2021-04-27 PROCEDURE — 83036 HEMOGLOBIN GLYCOSYLATED A1C: CPT | Performed by: INTERNAL MEDICINE

## 2021-04-27 NOTE — ASSESSMENT & PLAN NOTE
"Prominent palpable mass of the epigastric area which may be a prominent xiphoid process but warrants further evaluation. Does not appear to be an intraabdominal mass or AAA on examination. He reports it has been present for 5 years but it is \"growing\". No associated pain.     Check xray of the sternum/xiphoid initially. If negative, check US of the abdomen for further delineation.   "

## 2021-04-27 NOTE — PROGRESS NOTES
"    I N T E R N A L  M E D I C I N E  J U N O H  K I M  M D      ENCOUNTER DATE:  04/27/2021    Mega Santiago / 50 y.o. / male      CHIEF COMPLAINT / REASON FOR OFFICE VISIT     Diabetes, Hypertension, and Mass chest/abdomen      ASSESSMENT & PLAN     Problem List Items Addressed This Visit        High    Type 2 diabetes mellitus without complication (CMS/HCC) - Primary (Chronic)    Current Assessment & Plan     A1c level is 6.1 today. Lost 19 lbs since starting Ozempic and Farxiga.   Continue Farxiga 5 mg qAM.  Continue metformin 500 mg  mg 2 BID.  Continue Ozempic 0.5 mg weekly.          Relevant Medications    metFORMIN ER (GLUCOPHAGE-XR) 500 MG 24 hr tablet    dapagliflozin (FARXIGA) 5 MG tablet tablet    Ozempic, 0.25 or 0.5 MG/DOSE, 2 MG/1.5ML solution pen-injector    Epigastric swelling or mass or lump    Current Assessment & Plan     Prominent palpable mass of the epigastric area which may be a prominent xiphoid process but warrants further evaluation. Does not appear to be an intraabdominal mass or AAA on examination. He reports it has been present for 5 years but it is \"growing\". No associated pain.     Check xray of the sternum/xiphoid initially. If negative, check US of the abdomen for further delineation.          Relevant Orders    XR sternum pa and lateral       Medium    Hyperlipidemia (Chronic)    Overview     Continue atorvastatin 40 mg qd.          Relevant Medications    atorvastatin (LIPITOR) 40 MG tablet    Hypertension (Chronic)    Overview     Continue valsartan/hctz 320/25.          Relevant Medications    valsartan-hydrochlorothiazide (DIOVAN-HCT) 320-25 MG per tablet        Orders Placed This Encounter   Procedures   • XR sternum pa and lateral     No orders of the defined types were placed in this encounter.      SUMMARY/DISCUSSION  •       Next Appointment with me: Visit date not found    No follow-ups on file.        VITAL SIGNS     Visit Vitals  /80 (BP Location: Left " "arm)   Pulse 76   Temp 97.1 °F (36.2 °C)   Ht 167.6 cm (65.98\")   Wt 93.9 kg (207 lb)   SpO2 97%   BMI 33.43 kg/m²       BP Readings from Last 3 Encounters:   04/27/21 124/80   12/31/20 122/80   09/11/20 130/78     Wt Readings from Last 3 Encounters:   04/27/21 93.9 kg (207 lb)   12/31/20 98.4 kg (217 lb)   09/11/20 103 kg (226 lb)     Body mass index is 33.43 kg/m².      MEDICATIONS AT THE TIME OF OFFICE VISIT     Current Outpatient Medications on File Prior to Visit   Medication Sig   • atorvastatin (LIPITOR) 40 MG tablet Take 1 tablet by mouth Daily.   • Cetirizine HCl 10 MG capsule Take 10 mg by mouth daily.   • Cholecalciferol (VITAMIN D) 2000 UNITS capsule Take 1 capsule by mouth.   • dapagliflozin (FARXIGA) 5 MG tablet tablet Take 1 tablet by mouth Daily.   • metFORMIN ER (GLUCOPHAGE-XR) 500 MG 24 hr tablet Take 2 tablets by mouth 2 (two) times a day.   • Ozempic, 0.25 or 0.5 MG/DOSE, 2 MG/1.5ML solution pen-injector DIAL AND INJECT UNDER THE SKIN 0.5 MG WEEKLY AS DIRECTED   • valsartan-hydrochlorothiazide (DIOVAN-HCT) 320-25 MG per tablet Take 1 tablet by mouth Daily.       HISTORY OF PRESENT ILLNESS     Lost close to 20 lbs since starting Farxiga 5 and Ozempic. Denies significant side effects.   A1c today is down to 6.1.     Reports 5 years history of a mass on his lower sternum/epigastric area. No pain. \"Growing\" in size. Denies history of trauma/injury.   Besides the wt loss noted above, no unusual night sweat.     Lab Results   Component Value Date    HGBA1C 8.00 (H) 12/31/2020    HGBA1C 7.50 (H) 09/11/2020    HGBA1C 6.50 (H) 05/11/2020    CREATININE 1.18 12/31/2020    LDL 53 12/31/2020    MICROALBUR 13.3 01/10/2020          REVIEW OF SYSTEMS     GI neg    neg   No lymphadenopathy       PHYSICAL EXAMINATION     Physical Exam  Noted wt loss, appears well  There is a firm palpable mass (nontender/nonpulsatile) of the epigastric region. Abdomen is overweight normal on exam.       REVIEWED DATA     Labs: "       Lab Results   Component Value Date     12/31/2020    K 3.7 12/31/2020    CALCIUM 9.6 12/31/2020    AST 20 12/31/2020    ALT 35 12/31/2020    BUN 18 12/31/2020    CREATININE 1.18 12/31/2020    CREATININE 1.11 01/10/2020    CREATININE 1.07 09/12/2019    EGFRIFNONA 65 12/31/2020    EGFRIFAFRI 79 12/31/2020       Lab Results   Component Value Date    HGBA1C 8.00 (H) 12/31/2020    HGBA1C 7.50 (H) 09/11/2020    HGBA1C 6.50 (H) 05/11/2020       Lab Results   Component Value Date    LDL 53 12/31/2020    LDL 66 09/12/2019    HDL 50 12/31/2020    TRIG 62 12/31/2020       Lab Results   Component Value Date    TSH 1.900 09/12/2019    FREET4 1.49 09/12/2019       Lab Results   Component Value Date    WBC 9.29 09/12/2019    HGB 15.4 09/12/2019     09/12/2019         Imaging:           Medical Tests:           Summary of old records / correspondence / consultant report:           Request outside records:           *Examiner was wearing KN95 mask, face shield and exam gloves during the entire duration of the visit. Patient was masked the entire time.   Minimum social distance of 6 ft maintained entire visit except if physical contact was necessary as documented.     **Dragon Disclaimer:   Much of this encounter note is an electronic transcription/translation of spoken language to printed text. The electronic translation of spoken language may permit erroneous, or at times, nonsensical words or phrases to be inadvertently transcribed. Although I have reviewed the note for such errors, some may still exist.     Template created by Derrell Gray MD

## 2021-04-27 NOTE — ASSESSMENT & PLAN NOTE
A1c level is 6.1 today. Lost 19 lbs since starting Ozempic and Farxiga.   Continue Farxiga 5 mg qAM.  Continue metformin 500 mg  mg 2 BID.  Continue Ozempic 0.5 mg weekly.

## 2021-04-30 ENCOUNTER — HOSPITAL ENCOUNTER (OUTPATIENT)
Dept: GENERAL RADIOLOGY | Facility: HOSPITAL | Age: 51
Discharge: HOME | End: 2021-04-30
Admitting: INTERNAL MEDICINE

## 2021-04-30 PROCEDURE — 71120 X-RAY EXAM BREASTBONE 2/>VWS: CPT

## 2021-05-17 RX ORDER — DAPAGLIFLOZIN 5 MG/1
TABLET, FILM COATED ORAL
Qty: 30 TABLET | Refills: 11 | Status: SHIPPED | OUTPATIENT
Start: 2021-05-17 | End: 2021-09-27 | Stop reason: SDUPTHER

## 2021-06-30 ENCOUNTER — CLINICAL SUPPORT (OUTPATIENT)
Dept: INTERNAL MEDICINE | Age: 51
End: 2021-06-30

## 2021-06-30 DIAGNOSIS — Z23 NEED FOR HEPATITIS B VACCINATION: Primary | ICD-10-CM

## 2021-06-30 PROCEDURE — 90471 IMMUNIZATION ADMIN: CPT | Performed by: INTERNAL MEDICINE

## 2021-06-30 PROCEDURE — 90746 HEPB VACCINE 3 DOSE ADULT IM: CPT | Performed by: INTERNAL MEDICINE

## 2021-07-01 ENCOUNTER — TRANSCRIBE ORDERS (OUTPATIENT)
Dept: ADMINISTRATIVE | Facility: HOSPITAL | Age: 51
End: 2021-07-01

## 2021-07-01 DIAGNOSIS — C61 PROSTATE CANCER (HCC): Primary | ICD-10-CM

## 2021-07-28 ENCOUNTER — HOSPITAL ENCOUNTER (OUTPATIENT)
Dept: MRI IMAGING | Facility: HOSPITAL | Age: 51
Discharge: HOME | End: 2021-07-28

## 2021-07-28 ENCOUNTER — APPOINTMENT (OUTPATIENT)
Dept: OTHER | Facility: HOSPITAL | Age: 51
End: 2021-07-28

## 2021-07-28 DIAGNOSIS — Z09 FOLLOW UP: ICD-10-CM

## 2021-07-28 DIAGNOSIS — C61 PROSTATE CANCER (HCC): ICD-10-CM

## 2021-07-28 LAB — CREAT BLDA-MCNC: 1.2 MG/DL (ref 0.6–1.3)

## 2021-07-28 PROCEDURE — 0 GADOBENATE DIMEGLUMINE 529 MG/ML SOLUTION: Performed by: UROLOGY

## 2021-07-28 PROCEDURE — A9577 INJ MULTIHANCE: HCPCS | Performed by: UROLOGY

## 2021-07-28 PROCEDURE — 72197 MRI PELVIS W/O & W/DYE: CPT

## 2021-07-28 PROCEDURE — 82565 ASSAY OF CREATININE: CPT

## 2021-07-28 RX ADMIN — GADOBENATE DIMEGLUMINE 19 ML: 529 INJECTION, SOLUTION INTRAVENOUS at 07:11

## 2021-09-16 ENCOUNTER — TELEPHONE (OUTPATIENT)
Dept: INTERNAL MEDICINE | Age: 51
End: 2021-09-16

## 2021-09-16 NOTE — TELEPHONE ENCOUNTER
----- Message from Alber Gray MD sent at 9/16/2021  7:22 AM EDT -----  Regarding: CALL PATIENT to schedule future appt  I do not see he has a future appointment scheduled.

## 2021-09-27 ENCOUNTER — OFFICE VISIT (OUTPATIENT)
Dept: INTERNAL MEDICINE | Age: 51
End: 2021-09-27

## 2021-09-27 ENCOUNTER — DOCUMENTATION (OUTPATIENT)
Dept: INTERNAL MEDICINE | Age: 51
End: 2021-09-27

## 2021-09-27 VITALS
HEIGHT: 66 IN | HEART RATE: 59 BPM | WEIGHT: 201 LBS | OXYGEN SATURATION: 99 % | TEMPERATURE: 97.3 F | BODY MASS INDEX: 32.3 KG/M2 | SYSTOLIC BLOOD PRESSURE: 118 MMHG | DIASTOLIC BLOOD PRESSURE: 82 MMHG

## 2021-09-27 DIAGNOSIS — E66.9 OBESITY (BMI 30-39.9): Chronic | ICD-10-CM

## 2021-09-27 DIAGNOSIS — E11.9 TYPE 2 DIABETES MELLITUS WITHOUT COMPLICATION, WITHOUT LONG-TERM CURRENT USE OF INSULIN (HCC): Chronic | ICD-10-CM

## 2021-09-27 DIAGNOSIS — C61 PROSTATE CANCER (HCC): Chronic | ICD-10-CM

## 2021-09-27 DIAGNOSIS — E11.9 TYPE 2 DIABETES MELLITUS WITHOUT COMPLICATION, WITHOUT LONG-TERM CURRENT USE OF INSULIN (HCC): Primary | Chronic | ICD-10-CM

## 2021-09-27 DIAGNOSIS — I10 ESSENTIAL HYPERTENSION: Chronic | ICD-10-CM

## 2021-09-27 DIAGNOSIS — E78.2 MIXED HYPERLIPIDEMIA: ICD-10-CM

## 2021-09-27 LAB — HBA1C MFR BLD: 5.7 %

## 2021-09-27 PROCEDURE — 99214 OFFICE O/P EST MOD 30 MIN: CPT | Performed by: INTERNAL MEDICINE

## 2021-09-27 PROCEDURE — 90686 IIV4 VACC NO PRSV 0.5 ML IM: CPT | Performed by: INTERNAL MEDICINE

## 2021-09-27 PROCEDURE — 90471 IMMUNIZATION ADMIN: CPT | Performed by: INTERNAL MEDICINE

## 2021-09-27 RX ORDER — BLOOD SUGAR DIAGNOSTIC
STRIP MISCELLANEOUS
Qty: 100 EACH | Refills: 3 | Status: SHIPPED | OUTPATIENT
Start: 2021-09-27

## 2021-09-27 RX ORDER — LANCETS
EACH MISCELLANEOUS
Qty: 100 EACH | Refills: 3 | Status: SHIPPED | OUTPATIENT
Start: 2021-09-27

## 2021-09-27 RX ORDER — VALSARTAN AND HYDROCHLOROTHIAZIDE 320; 12.5 MG/1; MG/1
1 TABLET, FILM COATED ORAL DAILY
Qty: 90 TABLET | Refills: 3 | Status: SHIPPED | OUTPATIENT
Start: 2021-09-27 | End: 2022-09-28

## 2021-09-27 RX ORDER — DAPAGLIFLOZIN 5 MG/1
5 TABLET, FILM COATED ORAL DAILY
COMMUNITY
Start: 2021-09-27 | End: 2022-05-31

## 2021-09-27 RX ORDER — BLOOD-GLUCOSE METER
EACH MISCELLANEOUS
Qty: 1 EACH | Refills: 0 | Status: SHIPPED | OUTPATIENT
Start: 2021-09-27

## 2021-09-27 RX ORDER — SEMAGLUTIDE 1.34 MG/ML
0.5 INJECTION, SOLUTION SUBCUTANEOUS WEEKLY
Qty: 1 PEN | Refills: 5 | Status: SHIPPED | OUTPATIENT
Start: 2021-09-27 | End: 2022-03-14

## 2021-09-27 NOTE — ASSESSMENT & PLAN NOTE
Decrease HCTZ in valsartan HCT to 320/12.5 mg qd.   Monitor home blood pressure readings.      BP Readings from Last 3 Encounters:   09/27/21 118/82   04/27/21 124/80   12/31/20 122/80

## 2021-09-27 NOTE — ASSESSMENT & PLAN NOTE
Lab Results   Component Value Date    HGBA1C 5.7 09/27/2021    HGBA1C 6.1 04/27/2021    HGBA1C 8.00 (H) 12/31/2020      Will continue Farxiga at 5 mg qd since A1c is so much lower.   Continue Ozempic 0.5 mg weekly and Continue metformin  mg 2 BID.   Check A1c today.   Prescription for new glucometer sent.

## 2021-09-27 NOTE — ASSESSMENT & PLAN NOTE
Good wt loss. Increase Farxiga to 10 mg for cardioprotective and renal benefit. Should help with wt loss further.     Wt Readings from Last 3 Encounters:   09/27/21 91.2 kg (201 lb)   04/27/21 93.9 kg (207 lb)   12/31/20 98.4 kg (217 lb)

## 2021-09-27 NOTE — PROGRESS NOTES
iAshwarya:    Mega, here are the result(s) of your test(s):     A1c is actually very good. Stay at Farxiga 5 mg for now.   Will still change the HCTZ dose with your blood pressure medication.   Follow blood pressure closely.     Please do not hesitate to contact me if you have questions.

## 2021-09-27 NOTE — PROGRESS NOTES
I N T E R N A L  M E D I C I N E  J U N O H  K I M,  M D      ENCOUNTER DATE:  09/27/2021    Mega Santiago / 50 y.o. / male      CHIEF COMPLAINT / REASON FOR OFFICE VISIT     Diabetes      ASSESSMENT & PLAN     Problem List Items Addressed This Visit        High    Type 2 diabetes mellitus without complication (CMS/HCC) - Primary (Chronic)    Current Assessment & Plan     Lab Results   Component Value Date    HGBA1C 5.7 09/27/2021    HGBA1C 6.1 04/27/2021    HGBA1C 8.00 (H) 12/31/2020      Will continue Farxiga at 5 mg qd since A1c is so much lower.   Continue Ozempic 0.5 mg weekly and Continue metformin  mg 2 BID.   Check A1c today.   Prescription for new glucometer sent.          Relevant Medications    metFORMIN ER (GLUCOPHAGE-XR) 500 MG 24 hr tablet    Farxiga 5 MG tablet tablet    Blood Glucose Monitoring Suppl (ONE TOUCH ULTRA 2) w/Device kit    glucose blood (OneTouch Ultra) test strip    Lancets (onetouch ultrasoft) lancets    Semaglutide,0.25 or 0.5MG/DOS, (Ozempic, 0.25 or 0.5 MG/DOSE,) 2 MG/1.5ML solution pen-injector    valsartan-hydrochlorothiazide (DIOVAN-HCT) 320-12.5 MG per tablet    Other Relevant Orders    Comprehensive Metabolic Panel    Lipid Panel With / Chol / HDL Ratio    Microalbumin / Creatinine Urine Ratio - Urine, Clean Catch    POC Glycosylated Hemoglobin (Hb A1C) (Completed)       Medium    Hyperlipidemia (Chronic)    Overview     Continue atorvastatin 40 mg qd.          Relevant Medications    atorvastatin (LIPITOR) 40 MG tablet    Other Relevant Orders    Comprehensive Metabolic Panel    Lipid Panel With / Chol / HDL Ratio    Hypertension (Chronic)    Current Assessment & Plan     Decrease HCTZ in valsartan HCT to 320/12.5 mg qd.   Monitor home blood pressure readings.      BP Readings from Last 3 Encounters:   09/27/21 118/82   04/27/21 124/80   12/31/20 122/80             Relevant Medications    valsartan-hydrochlorothiazide (DIOVAN-HCT) 320-12.5 MG per tablet        Low    Obesity (BMI 30-39.9) (Chronic)    Current Assessment & Plan     Good wt loss. Increase Farxiga to 10 mg for cardioprotective and renal benefit. Should help with wt loss further.     Wt Readings from Last 3 Encounters:   09/27/21 91.2 kg (201 lb)   04/27/21 93.9 kg (207 lb)   12/31/20 98.4 kg (217 lb)             Prostate cancer (CMS/HCC) (Chronic)    Overview     *Seen at [facility]: planned 1 year observation, no treatment at this time         Current Assessment & Plan     Patient reports that recent MRI was negative.              Orders Placed This Encounter   Procedures   • FluLaval/Fluarix (VFC) >6 Months   • Comprehensive Metabolic Panel   • Lipid Panel With / Chol / HDL Ratio   • Microalbumin / Creatinine Urine Ratio - Urine, Clean Catch   • POC Glycosylated Hemoglobin (Hb A1C)     New Medications Ordered This Visit   Medications   • Blood Glucose Monitoring Suppl (ONE TOUCH ULTRA 2) w/Device kit     Sig: Check sugar daily.     Dispense:  1 each     Refill:  0     May substitute if needed for formulary purposes.   • glucose blood (OneTouch Ultra) test strip     Sig: Use as instructed     Dispense:  100 each     Refill:  3     MAY SUBSTITUTE AS NEEDED FOR PATIENT PREFERENCE AND INSURANCE FORMULARY PURPOSES   • Lancets (onetouch ultrasoft) lancets     Sig: Use as instructed     Dispense:  100 each     Refill:  3     May substitute as needed for insurance formulary reasons.   • Semaglutide,0.25 or 0.5MG/DOS, (Ozempic, 0.25 or 0.5 MG/DOSE,) 2 MG/1.5ML solution pen-injector     Sig: Inject 0.5 mg under the skin into the appropriate area as directed 1 (One) Time Per Week.     Dispense:  1 pen     Refill:  5   • valsartan-hydrochlorothiazide (DIOVAN-HCT) 320-12.5 MG per tablet     Sig: Take 1 tablet by mouth Daily.     Dispense:  90 tablet     Refill:  3       SUMMARY/DISCUSSION  •       Next Appointment with me: Visit date not found    Return in about 4 months (around 1/27/2022) for Reassess  "chronic medical problems.        VITAL SIGNS     Visit Vitals  /82 (BP Location: Left arm)   Pulse 59   Temp 97.3 °F (36.3 °C)   Ht 167.6 cm (65.98\")   Wt 91.2 kg (201 lb)   SpO2 99%   BMI 32.46 kg/m²       BP Readings from Last 3 Encounters:   09/27/21 118/82   04/27/21 124/80   12/31/20 122/80     Wt Readings from Last 3 Encounters:   09/27/21 91.2 kg (201 lb)   04/27/21 93.9 kg (207 lb)   12/31/20 98.4 kg (217 lb)     Body mass index is 32.46 kg/m².      MEDICATIONS AT THE TIME OF OFFICE VISIT     Current Outpatient Medications on File Prior to Visit   Medication Sig   • atorvastatin (LIPITOR) 40 MG tablet Take 1 tablet by mouth Daily.   • Cetirizine HCl 10 MG capsule Take 10 mg by mouth daily.   • Cholecalciferol (VITAMIN D) 2000 UNITS capsule Take 1 capsule by mouth.   • Farxiga 5 MG tablet tablet TAKE ONE TABLET BY MOUTH DAILY   • metFORMIN ER (GLUCOPHAGE-XR) 500 MG 24 hr tablet Take 2 tablets by mouth 2 (two) times a day.   • Ozempic, 0.25 or 0.5 MG/DOSE, 2 MG/1.5ML solution pen-injector DIAL AND INJECT UNDER THE SKIN 0.5 MG WEEKLY AS DIRECTED   • valsartan-hydrochlorothiazide (DIOVAN-HCT) 320-25 MG per tablet Take 1 tablet by mouth Daily.         HISTORY OF PRESENT ILLNESS     Needs a new glucometer. Last A1c improved to 6.1 (8.0) on Farxiga 5, Ozempic 0.5 and metformin. Denies significant GI side effects.   Hypertension remains stable.  Denies change in xiphoid prominence.   Recent prostate MRI without change.     Lab Results   Component Value Date    HGBA1C 5.7 09/27/2021    HGBA1C 6.1 04/27/2021    HGBA1C 8.00 (H) 12/31/2020    CREATININE 1.20 07/28/2021    LDL 53 12/31/2020    MICROALBUR 13.3 01/10/2020          REVIEW OF SYSTEMS     Constitutional neg except per HPI   Resp neg  CV neg   GI neg   neg for change      PHYSICAL EXAMINATION     Physical Exam  Wt loss noted  Psych: Normal mood and affect. Normal thought and judgment.       REVIEWED DATA     Labs:       Lab Results   Component Value " Date     12/31/2020    K 3.7 12/31/2020    CALCIUM 9.6 12/31/2020    AST 20 12/31/2020    ALT 35 12/31/2020    BUN 18 12/31/2020    CREATININE 1.20 07/28/2021    CREATININE 1.18 12/31/2020    CREATININE 1.11 01/10/2020    EGFRIFNONA 65 12/31/2020    EGFRIFAFRI 79 12/31/2020       Lab Results   Component Value Date    HGBA1C 5.7 09/27/2021    HGBA1C 6.1 04/27/2021    HGBA1C 8.00 (H) 12/31/2020       Lab Results   Component Value Date    LDL 53 12/31/2020    LDL 66 09/12/2019    HDL 50 12/31/2020    TRIG 62 12/31/2020       Lab Results   Component Value Date    TSH 1.900 09/12/2019    TSH 2.760 04/03/2017    FREET4 1.49 09/12/2019    FREET4 1.19 04/03/2017       Lab Results   Component Value Date    WBC 9.29 09/12/2019    HGB 15.4 09/12/2019     09/12/2019       Lab Results   Component Value Date    MICROALBUR 13.3 01/10/2020           Imaging:           Medical Tests:           Summary of old records / correspondence / consultant report:           Request outside records:           *Examiner was wearing KN95 mask and eye protection during the entire duration of the visit. Patient was masked the entire time. Minimum social distance of 6 ft maintained entire visit except if physical contact was necessary as documented.     **Dragon Disclaimer: Much of this encounter note is an electronic transcription/translation of spoken language to printed text. The electronic translation of spoken language may permit erroneous, or at times, nonsensical words or phrases to be inadvertently transcribed. Although I have reviewed the note for such errors, some may still exist.       Template created by Derrell Gray MD

## 2021-09-28 LAB
ALBUMIN SERPL-MCNC: 4.7 G/DL (ref 3.5–5.2)
ALBUMIN/CREAT UR: 5 MG/G CREAT (ref 0–29)
ALBUMIN/GLOB SERPL: 2 G/DL
ALP SERPL-CCNC: 71 U/L (ref 39–117)
ALT SERPL-CCNC: 34 U/L (ref 1–41)
AST SERPL-CCNC: 34 U/L (ref 1–40)
BILIRUB SERPL-MCNC: 0.7 MG/DL (ref 0–1.2)
BUN SERPL-MCNC: 15 MG/DL (ref 6–20)
BUN/CREAT SERPL: 13.8 (ref 7–25)
CALCIUM SERPL-MCNC: 9.8 MG/DL (ref 8.6–10.5)
CHLORIDE SERPL-SCNC: 100 MMOL/L (ref 98–107)
CHOLEST SERPL-MCNC: 118 MG/DL (ref 0–200)
CHOLEST/HDLC SERPL: 2.62 {RATIO}
CO2 SERPL-SCNC: 27.2 MMOL/L (ref 22–29)
CREAT SERPL-MCNC: 1.09 MG/DL (ref 0.76–1.27)
CREAT UR-MCNC: 191.3 MG/DL
GLOBULIN SER CALC-MCNC: 2.4 GM/DL
GLUCOSE SERPL-MCNC: 92 MG/DL (ref 65–99)
HDLC SERPL-MCNC: 45 MG/DL (ref 40–60)
LDLC SERPL CALC-MCNC: 58 MG/DL (ref 0–100)
MICROALBUMIN UR-MCNC: 10.2 UG/ML
POTASSIUM SERPL-SCNC: 3.6 MMOL/L (ref 3.5–5.2)
PROT SERPL-MCNC: 7.1 G/DL (ref 6–8.5)
SODIUM SERPL-SCNC: 140 MMOL/L (ref 136–145)
TRIGL SERPL-MCNC: 70 MG/DL (ref 0–150)
VLDLC SERPL CALC-MCNC: 15 MG/DL (ref 5–40)

## 2021-09-28 NOTE — PROGRESS NOTES
MyChart:    Mega, here are the result(s) of your test(s):     Labs are stable. Continue plans.     Please do not hesitate to contact me if you have questions.

## 2021-10-11 DIAGNOSIS — E11.9 TYPE 2 DIABETES MELLITUS WITHOUT COMPLICATION, WITHOUT LONG-TERM CURRENT USE OF INSULIN (HCC): Chronic | ICD-10-CM

## 2021-10-12 RX ORDER — METFORMIN HYDROCHLORIDE 500 MG/1
TABLET, EXTENDED RELEASE ORAL
Qty: 120 TABLET | Refills: 3 | Status: SHIPPED | OUTPATIENT
Start: 2021-10-12 | End: 2022-05-31

## 2022-01-14 DIAGNOSIS — E78.5 HYPERLIPIDEMIA: ICD-10-CM

## 2022-01-14 RX ORDER — ATORVASTATIN CALCIUM 40 MG/1
TABLET, FILM COATED ORAL
Qty: 90 TABLET | Refills: 3 | Status: SHIPPED | OUTPATIENT
Start: 2022-01-14 | End: 2023-02-10 | Stop reason: SDUPTHER

## 2022-02-14 ENCOUNTER — OFFICE VISIT (OUTPATIENT)
Dept: INTERNAL MEDICINE | Age: 52
End: 2022-02-14

## 2022-02-14 VITALS
BODY MASS INDEX: 32.78 KG/M2 | TEMPERATURE: 97.8 F | SYSTOLIC BLOOD PRESSURE: 126 MMHG | HEIGHT: 66 IN | HEART RATE: 87 BPM | WEIGHT: 204 LBS | OXYGEN SATURATION: 98 % | DIASTOLIC BLOOD PRESSURE: 80 MMHG

## 2022-02-14 DIAGNOSIS — E11.9 TYPE 2 DIABETES MELLITUS WITHOUT COMPLICATION, WITHOUT LONG-TERM CURRENT USE OF INSULIN: Primary | Chronic | ICD-10-CM

## 2022-02-14 DIAGNOSIS — R20.8 COMPLAINING OF COLD HANDS: ICD-10-CM

## 2022-02-14 DIAGNOSIS — Z12.11 SCREENING FOR COLON CANCER: ICD-10-CM

## 2022-02-14 DIAGNOSIS — E78.2 MIXED HYPERLIPIDEMIA: Chronic | ICD-10-CM

## 2022-02-14 DIAGNOSIS — C61 PROSTATE CANCER: Chronic | ICD-10-CM

## 2022-02-14 DIAGNOSIS — I10 PRIMARY HYPERTENSION: Chronic | ICD-10-CM

## 2022-02-14 DIAGNOSIS — E66.9 OBESITY (BMI 30-39.9): Chronic | ICD-10-CM

## 2022-02-14 PROCEDURE — 99214 OFFICE O/P EST MOD 30 MIN: CPT | Performed by: INTERNAL MEDICINE

## 2022-02-14 NOTE — ASSESSMENT & PLAN NOTE
Consider increasing dose of Ozempic for additional weight loss. This was discussed. For now continue Ozempic 0.5 mg, Metformin 500 mg 2 tablets twice daily along with Farxiga 5 mg daily.

## 2022-02-14 NOTE — PROGRESS NOTES
I N T E R N A L  M E D I C I N E  J U N O H  K I M,  M D      ENCOUNTER DATE:  02/14/2022    Mega Santiago / 51 y.o. / male      CHIEF COMPLAINT / REASON FOR OFFICE VISIT     Diabetes, Hypertension, and Hyperlipidemia      ASSESSMENT & PLAN     Problem List Items Addressed This Visit        High    Type 2 diabetes mellitus without complication (HCC) - Primary (Chronic)    Current Assessment & Plan     Consider increasing dose of Ozempic for additional weight loss. This was discussed. For now continue Ozempic 0.5 mg, Metformin 500 mg 2 tablets twice daily along with Farxiga 5 mg daily.         Relevant Medications    Blood Glucose Monitoring Suppl (ONE TOUCH ULTRA 2) w/Device kit    glucose blood (OneTouch Ultra) test strip    Lancets (onetouch ultrasoft) lancets    Semaglutide,0.25 or 0.5MG/DOS, (Ozempic, 0.25 or 0.5 MG/DOSE,) 2 MG/1.5ML solution pen-injector    valsartan-hydrochlorothiazide (DIOVAN-HCT) 320-12.5 MG per tablet    dapagliflozin (Farxiga) 5 MG tablet tablet    metFORMIN ER (GLUCOPHAGE-XR) 500 MG 24 hr tablet    Other Relevant Orders    TSH+Free T4    Hemoglobin A1c       Medium    Hyperlipidemia (Chronic)    Overview     Continue atorvastatin 40 mg qd.          Relevant Medications    atorvastatin (LIPITOR) 40 MG tablet    Other Relevant Orders    TSH+Free T4    Hypertension (Chronic)    Current Assessment & Plan     Blood pressure remained stable on valsartan hydrochlorothiazide 320-12.5 mg daily  Check blood pressure periodically at home    BP Readings from Last 3 Encounters:   02/14/22 126/80   09/27/21 118/82   04/27/21 124/80             Relevant Medications    valsartan-hydrochlorothiazide (DIOVAN-HCT) 320-12.5 MG per tablet    Other Relevant Orders    TSH+Free T4       Low    Obesity (BMI 30-39.9) (Chronic)    Prostate cancer (HCC) (Chronic)    Overview     *Seen at [facility]: planned 1 year observation, no treatment at this time           Other Visit Diagnoses      "Complaining of cold hands        Relevant Orders    Vitamin B12    Screening for colon cancer        Relevant Orders    Cologuard - Stool, Per Rectum        Orders Placed This Encounter   Procedures   • TSH+Free T4   • Hemoglobin A1c   • Vitamin B12   • Cologuard - Stool, Per Rectum     No orders of the defined types were placed in this encounter.      SUMMARY/DISCUSSION  •       Next Appointment with me: Visit date not found    Return in about 5 months (around 7/14/2022) for COMBINED annual physical & chronic medical.        VITAL SIGNS     Visit Vitals  /80 (BP Location: Left arm)   Pulse 87   Temp 97.8 °F (36.6 °C)   Ht 167.6 cm (65.98\")   Wt 92.5 kg (204 lb)   SpO2 98%   BMI 32.95 kg/m²       BP Readings from Last 3 Encounters:   02/14/22 126/80   09/27/21 118/82   04/27/21 124/80     Wt Readings from Last 3 Encounters:   02/14/22 92.5 kg (204 lb)   09/27/21 91.2 kg (201 lb)   04/27/21 93.9 kg (207 lb)     Body mass index is 32.95 kg/m².      MEDICATIONS AT THE TIME OF OFFICE VISIT     Current Outpatient Medications on File Prior to Visit   Medication Sig   • atorvastatin (LIPITOR) 40 MG tablet TAKE ONE TABLET BY MOUTH DAILY   • Blood Glucose Monitoring Suppl (ONE TOUCH ULTRA 2) w/Device kit Check sugar daily.   • Cetirizine HCl 10 MG capsule Take 10 mg by mouth daily.   • Cholecalciferol (VITAMIN D) 2000 UNITS capsule Take 1 capsule by mouth.   • dapagliflozin (Farxiga) 5 MG tablet tablet Take 1 tablet by mouth Daily.   • glucose blood (OneTouch Ultra) test strip Use as instructed   • Lancets (onetouch ultrasoft) lancets Use as instructed   • metFORMIN ER (GLUCOPHAGE-XR) 500 MG 24 hr tablet TAKE TWO TABLETS BY MOUTH TWICE A DAY   • Semaglutide,0.25 or 0.5MG/DOS, (Ozempic, 0.25 or 0.5 MG/DOSE,) 2 MG/1.5ML solution pen-injector Inject 0.5 mg under the skin into the appropriate area as directed 1 (One) Time Per Week.   • valsartan-hydrochlorothiazide (DIOVAN-HCT) 320-12.5 MG per tablet Take 1 tablet by mouth " Daily.     No current facility-administered medications on file prior to visit.         HISTORY OF PRESENT ILLNESS     Diabetes remains under excellent control on current medications. Denies significant side effects from medications. Does not monitor blood pressure at home but today blood pressure remains stable. Previously decreased hydrochlorothiazide and valsartan hydrochlorothiazide to 320-12.5 mg daily. Hyperlipidemia remains stable on atorvastatin 40 mg.    He is planned to have prostate biopsy again with his urologist.    Complains of cold sensation of his hands and finds that he is dropping things more frequently. Denies paresthesia or pain of his fingers or hands. Denies numbness tingling or pain of his toes or feet.    Lab Results   Component Value Date    HGBA1C 5.7 09/27/2021    HGBA1C 6.1 04/27/2021    HGBA1C 8.00 (H) 12/31/2020    CREATININE 1.09 09/27/2021    LDL 58 09/27/2021    MICROALBUR 10.2 09/27/2021      Wt Readings from Last 3 Encounters:   02/14/22 92.5 kg (204 lb)   09/27/21 91.2 kg (201 lb)   04/27/21 93.9 kg (207 lb)         REVIEW OF SYSTEMS     Constitutional neg except per HPI   Resp neg  CV neg   GI neg   prostate cancer (to undergo biopsy), no change in urination   Neuro per HPI       PHYSICAL EXAMINATION     Physical Exam  Feet:      Comments: Diabetic Foot Exam Performed and Monofilament Test Performed     Monofilament test is normal.       Mild weight gain noted  Hands normal         REVIEWED DATA     Labs:       Lab Results   Component Value Date     09/27/2021    K 3.6 09/27/2021    CALCIUM 9.8 09/27/2021    AST 34 09/27/2021    ALT 34 09/27/2021    BUN 15 09/27/2021    CREATININE 1.09 09/27/2021    CREATININE 1.20 07/28/2021    CREATININE 1.18 12/31/2020    EGFRIFNONA 71 09/27/2021    EGFRIFAFRI 86 09/27/2021       Lab Results   Component Value Date    HGBA1C 5.7 09/27/2021    HGBA1C 6.1 04/27/2021    HGBA1C 8.00 (H) 12/31/2020       Lab Results   Component Value Date     LDL 58 09/27/2021    LDL 53 12/31/2020    HDL 45 09/27/2021    TRIG 70 09/27/2021       Lab Results   Component Value Date    TSH 1.900 09/12/2019    TSH 2.760 04/03/2017    FREET4 1.49 09/12/2019    FREET4 1.19 04/03/2017       Lab Results   Component Value Date    WBC 9.29 09/12/2019    HGB 15.4 09/12/2019     09/12/2019       Lab Results   Component Value Date    MICROALBUR 10.2 09/27/2021         No results found for: [id number]     Imaging:           Medical Tests:           Summary of old records / correspondence / consultant report:           Request outside records:           *Examiner was wearing KN95 mask and eye protection during the entire duration of the visit. Patient was masked the entire time. Minimum social distance of 6 ft maintained entire visit except if physical contact was necessary as documented.       Template created by Derrell Gray MD

## 2022-02-14 NOTE — PATIENT INSTRUCTIONS
** IMPORTANT MESSAGE FROM DR. LAGUNA **    In our office, your satisfaction is VERY important to us.     You may receive a survey from [facility] by mail or E-mail for you to provide feedback about your visit. This information is invaluable for me to know what we can do to improve our services.     I ask that you please take a few minutes to complete the survey and let us know how we are doing in serving your needs. (You may receive the survey more than once for multiple visits)    Thank You !    Dr. Laguna    _________________________________________________________________________________________________________________________      ** ADDITIONAL INSTRUCTION / REMINDERS FROM DR. LAGUNA **    Be sure to complete the Cologuard colon cancer screening test. The kit will be mailed to your home.

## 2022-02-14 NOTE — ASSESSMENT & PLAN NOTE
Blood pressure remained stable on valsartan hydrochlorothiazide 320-12.5 mg daily  Check blood pressure periodically at home    BP Readings from Last 3 Encounters:   02/14/22 126/80   09/27/21 118/82   04/27/21 124/80

## 2022-02-15 LAB
HBA1C MFR BLD: 6.1 % (ref 4.8–5.6)
T4 FREE SERPL-MCNC: 1.43 NG/DL (ref 0.82–1.77)
TSH SERPL DL<=0.005 MIU/L-ACNC: 2.07 UIU/ML (ref 0.45–4.5)
VIT B12 SERPL-MCNC: 644 PG/ML (ref 232–1245)

## 2022-02-15 NOTE — PROGRESS NOTES
MyChart:    Here are the result(s) of your test(s):     A1c for average glucose level is little higher. Let me know if you are interested in increasing the dose of Ozempic.     Thyroid level is stable     Vitamin B12 level is within normal     Please do not hesitate to contact me if you have questions.

## 2022-03-12 DIAGNOSIS — E11.9 TYPE 2 DIABETES MELLITUS WITHOUT COMPLICATION, WITHOUT LONG-TERM CURRENT USE OF INSULIN: Chronic | ICD-10-CM

## 2022-03-14 RX ORDER — SEMAGLUTIDE 1.34 MG/ML
INJECTION, SOLUTION SUBCUTANEOUS
Qty: 4.5 ML | Refills: 3 | Status: SHIPPED | OUTPATIENT
Start: 2022-03-14 | End: 2023-02-10 | Stop reason: DRUGHIGH

## 2022-05-30 DIAGNOSIS — E11.9 TYPE 2 DIABETES MELLITUS WITHOUT COMPLICATION, WITHOUT LONG-TERM CURRENT USE OF INSULIN: Chronic | ICD-10-CM

## 2022-05-31 RX ORDER — DAPAGLIFLOZIN 5 MG/1
TABLET, FILM COATED ORAL
Qty: 30 TABLET | Refills: 5 | Status: SHIPPED | OUTPATIENT
Start: 2022-05-31 | End: 2022-12-12

## 2022-05-31 RX ORDER — METFORMIN HYDROCHLORIDE 500 MG/1
TABLET, EXTENDED RELEASE ORAL
Qty: 120 TABLET | Refills: 5 | Status: SHIPPED | OUTPATIENT
Start: 2022-05-31 | End: 2023-02-10 | Stop reason: SDUPTHER

## 2022-06-10 DIAGNOSIS — I10 ESSENTIAL HYPERTENSION: ICD-10-CM

## 2022-06-10 RX ORDER — VALSARTAN AND HYDROCHLOROTHIAZIDE 320; 25 MG/1; MG/1
TABLET, FILM COATED ORAL
Qty: 90 TABLET | Refills: 3 | OUTPATIENT
Start: 2022-06-10

## 2022-07-19 DIAGNOSIS — Z12.5 ENCOUNTER FOR SCREENING FOR MALIGNANT NEOPLASM OF PROSTATE: ICD-10-CM

## 2022-07-19 DIAGNOSIS — Z00.00 PREVENTATIVE HEALTH CARE: Primary | ICD-10-CM

## 2022-07-25 DIAGNOSIS — Z00.00 PREVENTATIVE HEALTH CARE: ICD-10-CM

## 2022-07-25 DIAGNOSIS — Z12.5 ENCOUNTER FOR SCREENING FOR MALIGNANT NEOPLASM OF PROSTATE: ICD-10-CM

## 2022-07-27 LAB
ALBUMIN SERPL-MCNC: 4.5 G/DL (ref 3.8–4.9)
ALBUMIN/GLOB SERPL: 1.6 {RATIO} (ref 1.2–2.2)
ALP SERPL-CCNC: 77 IU/L (ref 44–121)
ALT SERPL-CCNC: 24 IU/L (ref 0–44)
APPEARANCE UR: CLEAR
AST SERPL-CCNC: 20 IU/L (ref 0–40)
BASOPHILS # BLD AUTO: 0.1 X10E3/UL (ref 0–0.2)
BASOPHILS NFR BLD AUTO: 1 %
BILIRUB SERPL-MCNC: 0.5 MG/DL (ref 0–1.2)
BILIRUB UR QL STRIP: NEGATIVE
BUN SERPL-MCNC: 19 MG/DL (ref 6–24)
BUN/CREAT SERPL: 17 (ref 9–20)
CALCIUM SERPL-MCNC: 9.5 MG/DL (ref 8.7–10.2)
CHLORIDE SERPL-SCNC: 100 MMOL/L (ref 96–106)
CHOLEST SERPL-MCNC: 138 MG/DL (ref 100–199)
CHOLEST/HDLC SERPL: 2.9 RATIO (ref 0–5)
CO2 SERPL-SCNC: 26 MMOL/L (ref 20–29)
COLOR UR: YELLOW
CREAT SERPL-MCNC: 1.12 MG/DL (ref 0.76–1.27)
EGFRCR SERPLBLD CKD-EPI 2021: 79 ML/MIN/1.73
EOSINOPHIL # BLD AUTO: 0.2 X10E3/UL (ref 0–0.4)
EOSINOPHIL NFR BLD AUTO: 3 %
ERYTHROCYTE [DISTWIDTH] IN BLOOD BY AUTOMATED COUNT: 12 % (ref 11.6–15.4)
GLOBULIN SER CALC-MCNC: 2.9 G/DL (ref 1.5–4.5)
GLUCOSE SERPL-MCNC: 90 MG/DL (ref 65–99)
GLUCOSE UR QL STRIP: ABNORMAL
HBA1C MFR BLD: 5.8 % (ref 4.8–5.6)
HCT VFR BLD AUTO: 48.1 % (ref 37.5–51)
HDLC SERPL-MCNC: 47 MG/DL
HGB BLD-MCNC: 15.6 G/DL (ref 13–17.7)
HGB UR QL STRIP: NEGATIVE
IMM GRANULOCYTES # BLD AUTO: 0 X10E3/UL (ref 0–0.1)
IMM GRANULOCYTES NFR BLD AUTO: 0 %
KETONES UR QL STRIP: NEGATIVE
LDLC SERPL CALC-MCNC: 78 MG/DL (ref 0–99)
LEUKOCYTE ESTERASE UR QL STRIP: NEGATIVE
LYMPHOCYTES # BLD AUTO: 2.8 X10E3/UL (ref 0.7–3.1)
LYMPHOCYTES NFR BLD AUTO: 40 %
MCH RBC QN AUTO: 30.4 PG (ref 26.6–33)
MCHC RBC AUTO-ENTMCNC: 32.4 G/DL (ref 31.5–35.7)
MCV RBC AUTO: 94 FL (ref 79–97)
MICRO URNS: ABNORMAL
MONOCYTES # BLD AUTO: 0.5 X10E3/UL (ref 0.1–0.9)
MONOCYTES NFR BLD AUTO: 6 %
NEUTROPHILS # BLD AUTO: 3.6 X10E3/UL (ref 1.4–7)
NEUTROPHILS NFR BLD AUTO: 50 %
NITRITE UR QL STRIP: NEGATIVE
PH UR STRIP: 5.5 [PH] (ref 5–7.5)
PLATELET # BLD AUTO: 406 X10E3/UL (ref 150–450)
POTASSIUM SERPL-SCNC: 4.1 MMOL/L (ref 3.5–5.2)
PROT SERPL-MCNC: 7.4 G/DL (ref 6–8.5)
PROT UR QL STRIP: ABNORMAL
PSA SERPL-MCNC: 3.1 NG/ML (ref 0–4)
RBC # BLD AUTO: 5.13 X10E6/UL (ref 4.14–5.8)
SODIUM SERPL-SCNC: 139 MMOL/L (ref 134–144)
SP GR UR STRIP: >=1.03 (ref 1–1.03)
T4 FREE SERPL-MCNC: 1.48 NG/DL (ref 0.82–1.77)
TRIGL SERPL-MCNC: 62 MG/DL (ref 0–149)
TSH SERPL DL<=0.005 MIU/L-ACNC: 1.25 UIU/ML (ref 0.45–4.5)
UROBILINOGEN UR STRIP-MCNC: 0.2 MG/DL (ref 0.2–1)
VLDLC SERPL CALC-MCNC: 13 MG/DL (ref 5–40)
WBC # BLD AUTO: 7.1 X10E3/UL (ref 3.4–10.8)

## 2022-07-29 ENCOUNTER — OFFICE VISIT (OUTPATIENT)
Dept: INTERNAL MEDICINE | Age: 52
End: 2022-07-29

## 2022-07-29 ENCOUNTER — TELEPHONE (OUTPATIENT)
Dept: INTERNAL MEDICINE | Age: 52
End: 2022-07-29

## 2022-07-29 VITALS
TEMPERATURE: 97.8 F | WEIGHT: 194.4 LBS | SYSTOLIC BLOOD PRESSURE: 122 MMHG | BODY MASS INDEX: 31.24 KG/M2 | OXYGEN SATURATION: 97 % | DIASTOLIC BLOOD PRESSURE: 72 MMHG | HEIGHT: 66 IN | HEART RATE: 96 BPM

## 2022-07-29 DIAGNOSIS — R19.7 DIARRHEA, UNSPECIFIED TYPE: Primary | ICD-10-CM

## 2022-07-29 DIAGNOSIS — K21.00 GASTROESOPHAGEAL REFLUX DISEASE WITH ESOPHAGITIS WITHOUT HEMORRHAGE: ICD-10-CM

## 2022-07-29 PROCEDURE — 99213 OFFICE O/P EST LOW 20 MIN: CPT | Performed by: NURSE PRACTITIONER

## 2022-07-29 RX ORDER — OMEPRAZOLE 20 MG/1
20 CAPSULE, DELAYED RELEASE ORAL 2 TIMES DAILY
COMMUNITY

## 2022-07-29 NOTE — TELEPHONE ENCOUNTER
Caller: Mega Arevalo    Relationship: Self    Best call back number: [phone] (M)    What is the best time to reach you: ANYTIME, ASAP    Who are you requesting to speak with (clinical staff, provider,  specific staff member): CLINICAL STAFF/ DR LAGUNA    Do you know the name of the person who called: MEGA AREVALO    What was the call regarding: PATIENT STATES HE HAS HAD DIARRHEA SINCE LAST Tuesday 07/26/2022 AND HE STILL HAS THIS WITH NO FEVER BUT DOES HAVE ACID REFLUX WITH SOME VOMITING, PATIENT STATES HE HAS BEEN TAKING IMMODIUM AD BUT THIS HAS NOT STOPPED THE DIARRHEA, PATIENT IS ASKING IF THERE IS SOMETHING ELSE HE NEEDS TO BE DOING OR TAKING FOR THIS, PLEASE ADVISE PATIENT ASAP    Do you require a callback: YES, ASAP

## 2022-07-29 NOTE — PROGRESS NOTES
"    I N T E R N A L  M E D I C I N E  Shara Rapp, APRN    ENCOUNTER DATE:  07/29/2022    Mega Santiago / 51 y.o. / male      CHIEF COMPLAINT / REASON FOR OFFICE VISIT     Diarrhea (Taking immodium) and Vomiting      ASSESSMENT & PLAN     Diagnoses and all orders for this visit:    1. Diarrhea, unspecified type (Primary)  -     Basic metabolic panel  - Continue Imodium as directed  - Encouraged bland diet and po fluids    2. Gastroesophageal reflux disease with esophagitis without hemorrhage       - Resume Prilosec 20 mg 1-2 tablets daily, 30 min prior to bedtime as previously taking    SUMMARY/DISCUSSION  • Discussion of Intestinal Flu and GERD and management with [place] diet/RICE diet, po fluids, medications  • Follow up with Dr Gray as scheduled and as needed  • I spent 25 min in direct care of this patient on this date of service. This time includes times spent by me in the following activities: Preparing for the visit, obtaining and/or reviewing a separately obtained history, performing a medically appropriate examination and/or evaluation, reviewing medical records, reviewing tests, ordering medications, tests, or procedures, counseling and educating the patient, documenting information in the medical record and reviewing office note/correspondence from other providers.     Return in about 4 days (around 8/2/2022) for Next scheduled follow up.      VITAL SIGNS     Visit Vitals  /72 (Cuff Size: Large Adult)   Pulse 96   Temp 97.8 °F (36.6 °C) (Temporal)   Ht 167.6 cm (65.98\")   Wt 88.2 kg (194 lb 6.4 oz)   SpO2 97%   BMI 31.40 kg/m²           BP Readings from Last 3 Encounters:   07/29/22 122/72   02/14/22 126/80   09/27/21 118/82     Wt Readings from Last 3 Encounters:   07/29/22 88.2 kg (194 lb 6.4 oz)   02/14/22 92.5 kg (204 lb)   09/27/21 91.2 kg (201 lb)     Body mass index is 31.4 kg/m².    Blood pressure readings recorded on patient flowsheet:  No flowsheet data found.          MEDICATIONS AT " THE TIME OF OFFICE VISIT     Current Outpatient Medications on File Prior to Visit   Medication Sig Dispense Refill   • atorvastatin (LIPITOR) 40 MG tablet TAKE ONE TABLET BY MOUTH DAILY 90 tablet 3   • Blood Glucose Monitoring Suppl (ONE TOUCH ULTRA 2) w/Device kit Check sugar daily. 1 each 0   • Cholecalciferol (VITAMIN D) 2000 UNITS capsule Take 1 capsule by mouth.     • Farxiga 5 MG tablet tablet TAKE ONE TABLET BY MOUTH DAILY 30 tablet 5   • glucose blood (OneTouch Ultra) test strip Use as instructed 100 each 3   • Lancets (onetouch ultrasoft) lancets Use as instructed 100 each 3   • metFORMIN ER (GLUCOPHAGE-XR) 500 MG 24 hr tablet TAKE TWO TABLETS BY MOUTH TWICE A  tablet 5   • omeprazole (priLOSEC) 20 MG capsule Take 20 mg by mouth 2 (Two) Times a Day.     • Ozempic, 0.25 or 0.5 MG/DOSE, 2 MG/1.5ML solution pen-injector DIAL AND INJECT UNDER THE SKIN 0.5 MG WEEKLY 4.5 mL 3   • valsartan-hydrochlorothiazide (DIOVAN-HCT) 320-12.5 MG per tablet Take 1 tablet by mouth Daily. 90 tablet 3   • [DISCONTINUED] Cetirizine HCl 10 MG capsule Take 10 mg by mouth daily.       No current facility-administered medications on file prior to visit.        HISTORY OF PRESENT ILLNESS     51 year old male being seen today for acute diarrhea. States he had gone out to dinner last Tuesday evening and states started having heartburn, nausea, diarrhea, vomited x 1 after gong to bed, started Tuesday evening. States the diarrhea continues- denies odor, blood, or mucous, denies any abdominal pain. Discussion of possible Influenza/food poisoning and to continue imodium as directed and bland diet, po fluids as tolerated. Discussion of GERD and will resume Prilosec 20 mg up to twice daily as needed- states used to take in the past. BMP ordered. States BS 96-98 and stable. Discussion of diarrhea as side effect of several of his medications- no changes recently in his meds. Instructed to call if no better by Monday or go to the ER for  acute abdominal pain.     Patient Care Team:  Alber Gray MD as PCP - General  Cesar Ramos DO as Consulting Physician (Ophthalmology)    REVIEW OF SYSTEMS     Review of Systems   Constitutional: Positive for appetite change. Negative for chills and fever.   HENT: Negative.    Respiratory: Negative.    Cardiovascular: Negative.    Gastrointestinal: Positive for diarrhea, nausea and vomiting. Negative for abdominal pain and blood in stool.   Genitourinary: Negative.    Skin: Negative.    Neurological: Negative.           PHYSICAL EXAMINATION     Physical Exam  Cardiovascular:      Rate and Rhythm: Normal rate and regular rhythm.      Pulses: Normal pulses.      Heart sounds: Normal heart sounds.   Pulmonary:      Effort: Pulmonary effort is normal.      Breath sounds: Normal breath sounds.   Abdominal:      General: There is no distension.      Palpations: Abdomen is soft.      Tenderness: There is no abdominal tenderness. There is no guarding or rebound.   Skin:     General: Skin is warm and dry.   Neurological:      Mental Status: He is alert and oriented to person, place, and time.             REVIEWED DATA     Labs:     Lab Results   Component Value Date     07/26/2022    K 4.1 07/26/2022    CALCIUM 9.5 07/26/2022    AST 20 07/26/2022    ALT 24 07/26/2022    BUN 19 07/26/2022    CREATININE 1.12 07/26/2022    CREATININE 1.09 09/27/2021    CREATININE 1.20 07/28/2021    EGFRIFNONA 71 09/27/2021    EGFRIFAFRI 86 09/27/2021       Lab Results   Component Value Date    HGBA1C 5.8 (H) 07/26/2022    HGBA1C 6.1 (H) 02/14/2022    HGBA1C 5.7 09/27/2021       Lab Results   Component Value Date    LDL 78 07/26/2022    LDL 58 09/27/2021    LDL 53 12/31/2020    HDL 47 07/26/2022    HDL 45 09/27/2021    TRIG 62 07/26/2022    TRIG 70 09/27/2021       Lab Results   Component Value Date    TSH 1.250 07/26/2022    TSH 2.070 02/14/2022    TSH 1.900 09/12/2019    FREET4 1.48 07/26/2022    FREET4 1.43 02/14/2022     FREET4 1.49 09/12/2019       Lab Results   Component Value Date    WBC 7.1 07/26/2022    HGB 15.6 07/26/2022     07/26/2022       Lab Results   Component Value Date    MALBCRERATIO 5 09/27/2021          Imaging:           Medical Tests:           Summary of old records / correspondence / consultant report:           Request outside records:           KRISTIE Shelton

## 2022-07-29 NOTE — TELEPHONE ENCOUNTER
Caller: Mega Santiago    Relationship: Self    Best call back number: [phone]    What was the call regarding: PATIENT WOUND UP CALLING BACK AND SCHEDULING FOR A SAME DAY APPOINTMENT WITH KRISTIE STEPHENS. NO LONGER NEEDS A CALL BACK.    Do you require a callback: NO

## 2022-07-30 LAB
BUN SERPL-MCNC: 47 MG/DL (ref 6–24)
BUN/CREAT SERPL: 22 (ref 9–20)
CALCIUM SERPL-MCNC: 10.1 MG/DL (ref 8.7–10.2)
CHLORIDE SERPL-SCNC: 97 MMOL/L (ref 96–106)
CO2 SERPL-SCNC: 23 MMOL/L (ref 20–29)
CREAT SERPL-MCNC: 2.17 MG/DL (ref 0.76–1.27)
EGFRCR SERPLBLD CKD-EPI 2021: 36 ML/MIN/1.73
GLUCOSE SERPL-MCNC: 106 MG/DL (ref 65–99)
POTASSIUM SERPL-SCNC: 3.9 MMOL/L (ref 3.5–5.2)
SODIUM SERPL-SCNC: 137 MMOL/L (ref 134–144)

## 2022-08-02 ENCOUNTER — OFFICE VISIT (OUTPATIENT)
Dept: INTERNAL MEDICINE | Age: 52
End: 2022-08-02

## 2022-08-02 VITALS
SYSTOLIC BLOOD PRESSURE: 110 MMHG | OXYGEN SATURATION: 98 % | HEART RATE: 82 BPM | DIASTOLIC BLOOD PRESSURE: 78 MMHG | HEIGHT: 66 IN | WEIGHT: 196 LBS | BODY MASS INDEX: 31.5 KG/M2 | TEMPERATURE: 97.7 F

## 2022-08-02 DIAGNOSIS — N28.9 ACUTE RENAL INSUFFICIENCY: ICD-10-CM

## 2022-08-02 DIAGNOSIS — Z00.00 ENCOUNTER FOR ANNUAL HEALTH EXAMINATION: Primary | ICD-10-CM

## 2022-08-02 DIAGNOSIS — Z91.018 MULTIPLE FOOD ALLERGIES: ICD-10-CM

## 2022-08-02 DIAGNOSIS — I10 PRIMARY HYPERTENSION: Chronic | ICD-10-CM

## 2022-08-02 DIAGNOSIS — E11.9 TYPE 2 DIABETES MELLITUS WITHOUT COMPLICATION, WITHOUT LONG-TERM CURRENT USE OF INSULIN: Chronic | ICD-10-CM

## 2022-08-02 DIAGNOSIS — Z01.84 IMMUNITY STATUS TESTING: ICD-10-CM

## 2022-08-02 PROCEDURE — 99214 OFFICE O/P EST MOD 30 MIN: CPT | Performed by: INTERNAL MEDICINE

## 2022-08-02 PROCEDURE — 99396 PREV VISIT EST AGE 40-64: CPT | Performed by: INTERNAL MEDICINE

## 2022-08-02 RX ORDER — CETIRIZINE HYDROCHLORIDE 10 MG/1
10 TABLET ORAL NIGHTLY
COMMUNITY

## 2022-08-02 NOTE — PROGRESS NOTES
"    I N T E R N A L  M E D I C I N E  J U N O H  K I M,  M D      ENCOUNTER DATE:  08/02/2022    Mega Santiago / 51 y.o. / male    CHIEF COMPLAINT     Annual Exam (9/12/19) and chronic medical problems      VITALS     Vitals:    08/02/22 0730   BP: 110/78   BP Location: Left arm   Pulse: 82   Temp: 97.7 °F (36.5 °C)   SpO2: 98%   Weight: 88.9 kg (196 lb)   Height: 167.6 cm (65.98\")       BP Readings from Last 3 Encounters:   08/02/22 110/78   07/29/22 122/72   02/14/22 126/80     Wt Readings from Last 3 Encounters:   08/02/22 88.9 kg (196 lb)   07/29/22 88.2 kg (194 lb 6.4 oz)   02/14/22 92.5 kg (204 lb)      Body mass index is 31.65 kg/m².    Blood pressure readings recorded on patient flowsheet:  No flowsheet data found.     MEDICATIONS     Current Outpatient Medications on File Prior to Visit   Medication Sig Dispense Refill   • atorvastatin (LIPITOR) 40 MG tablet TAKE ONE TABLET BY MOUTH DAILY 90 tablet 3   • Blood Glucose Monitoring Suppl (ONE TOUCH ULTRA 2) w/Device kit Check sugar daily. 1 each 0   • cetirizine (zyrTEC) 10 MG tablet Take 10 mg by mouth Every Night.     • Cholecalciferol (VITAMIN D) 2000 UNITS capsule Take 1 capsule by mouth.     • Farxiga 5 MG tablet tablet TAKE ONE TABLET BY MOUTH DAILY 30 tablet 5   • glucose blood (OneTouch Ultra) test strip Use as instructed 100 each 3   • Lancets (onetouch ultrasoft) lancets Use as instructed 100 each 3   • metFORMIN ER (GLUCOPHAGE-XR) 500 MG 24 hr tablet TAKE TWO TABLETS BY MOUTH TWICE A  tablet 5   • omeprazole (priLOSEC) 20 MG capsule Take 20 mg by mouth 2 (Two) Times a Day.     • Ozempic, 0.25 or 0.5 MG/DOSE, 2 MG/1.5ML solution pen-injector DIAL AND INJECT UNDER THE SKIN 0.5 MG WEEKLY 4.5 mL 3   • valsartan-hydrochlorothiazide (DIOVAN-HCT) 320-12.5 MG per tablet Take 1 tablet by mouth Daily. 90 tablet 3     No current facility-administered medications on file prior to visit.         HISTORY OF PRESENT ILLNESS      Mega presents for " annual health maintenance visit.  He was recently treated for acute gastroenteritis symptoms with sever nausea/vomiting/diarrhea. Symptoms started within 4 hours of eating at [employer]. Saw APRN and treated symptomatically and started on PPI for heartburn symptoms. He is noted have acute rise in creatinine of 2.1 with elevated BUN/Creatinine. No modifications to his medications were made. He denies having taken any OTC NSAIDS. Denies having had blood in stool or urine. Currently feels much better with resolution of GI symptoms. Denies difficulty with urination. Has appointment for 2nd prostate biopsy in September.  Diabetes remains stable overall with A1c of 5.8 on Ozempic, Farxiga and metformin. Hypertension remains stable on valsartan HCT.     · General health: multiple medical problems  · Lifestyle:  · Attempting to lose weight?: Yes   · Diet: eats decently  · Exercise: does not exercise  · Tobacco: Never used   · Alcohol: occasional/infrequent  · Work: Full-time  · Reproductive health:  · Sexually active?: Yes   · Concern for STD?: No   · Sexual problems?: No problems   · Sees Urologist?: Yes for prostate cancer   · Depression Screening:      PHQ-2/PHQ-9 Depression Screening 7/29/2022   Retired PHQ-9 Total Score -   Retired Total Score -   Little Interest or Pleasure in Doing Things 0-->not at all   Feeling Down, Depressed or Hopeless 0-->not at all   PHQ-9: Brief Depression Severity Measure Score 0         PHQ-2: 0 (Not depressed)     PHQ-9: 0 (Negative screening for depression)    Patient Care Team:  Alber Gray MD as PCP - Cesar Sweet DO as Consulting Physician (Ophthalmology)  ______________________________________________________________________    ALLERGIES  No Known Allergies     PFSH:     The following portions of the patient's history were reviewed and updated as appropriate: Allergies / Current Medications / Past Medical History / Surgical History / Social History / Family  History    PROBLEM LIST   Patient Active Problem List   Diagnosis   • Atopic rhinitis   • Hyperlipidemia   • Hypertension   • Hypogonadism in male   • Obesity (BMI 30-39.9)   • Obstructive sleep apnea syndrome   • Type 2 diabetes mellitus without complication (HCC)   • Vitamin D deficiency   • Prostate cancer (HCC)   • DDD (degenerative disc disease), lumbar   • Hydroureteronephrosis   • Ureterolithiasis   • Epigastric swelling or mass or lump   • Gastroesophageal reflux disease with esophagitis without hemorrhage   • Diarrhea       PAST MEDICAL HISTORY  Past Medical History:   Diagnosis Date   • Diabetes mellitus (HCC)    • GERD (gastroesophageal reflux disease)    • Hyperlipidemia    • Hypertension    • Hypogonadism in male    • Obesity    • Sleep apnea    • Vitamin D deficiency        SURGICAL HISTORY  Past Surgical History:   Procedure Laterality Date   • ANKLE SURGERY         SOCIAL HISTORY  Social History     Socioeconomic History   • Marital status:      Spouse name: Lisa   • Number of children: 2   Tobacco Use   • Smoking status: Never Smoker   • Smokeless tobacco: Never Used   Substance and Sexual Activity   • Alcohol use: Yes     Comment: occasional    • Drug use: No   • Sexual activity: Yes     Partners: Female       FAMILY HISTORY  Family History   Problem Relation Age of Onset   • Bipolar disorder Mother    • Hypertension Mother    • Hyperlipidemia Mother    • Diabetes type II Father    • No Known Problems Sister    • Breast cancer Maternal Grandmother    • Hypertension Maternal Grandmother    • Diabetes type II Maternal Grandmother    • No Known Problems Paternal Grandmother    • Colonic polyp Paternal Grandfather    • Prostate cancer Paternal Grandfather 80   • Diabetes type II Paternal Grandfather    • Hypothyroidism Maternal Aunt    • Parkinsonism Maternal Uncle 65   • Colon cancer Neg Hx        IMMUNIZATION HISTORY  Immunization History   Administered Date(s) Administered   • COVID-19  (PFIZER) PURPLE CAP 03/26/2021, 04/23/2021, 01/12/2022   • Covid-19 (Pfizer) Gray Cap 07/17/2022   • Flu Vaccine Quad PF >36MO 12/19/2017   • FluLaval/Fluarix/Fluzone >6 09/11/2020, 09/27/2021   • Flublok 18+yrs 11/02/2019   • Hepatitis A 05/08/2018, 12/14/2018   • Hepatitis B Vaccine Adult IM 12/31/2020, 01/29/2021, 06/30/2021   • Influenza TIV (IM) 10/01/2015   • Influenza, Unspecified 11/02/2019   • Pneumococcal Conjugate 13-Valent (PCV13) 04/03/2017   • Pneumococcal Polysaccharide (PPSV23) 12/19/2017   • Td 09/09/2017   • Tdap 09/28/2015   • flucelvax quad pfs =>4 YRS 10/26/2018         REVIEW OF SYSTEMS     Review of Systems   Constitutional: Negative.  Negative for chills, fever and unexpected weight change.   HENT: Positive for congestion.    Eyes: Negative.    Respiratory: Negative.    Cardiovascular: Negative.  Negative for chest pain.   Gastrointestinal: Negative.         Recent nausea/vomiting/diarrhea (resolved GI symptoms)   Endocrine: Negative.         Diabetes    Genitourinary: Negative.  Negative for difficulty urinating, frequency and hematuria.   Musculoskeletal: Negative.    Skin: Negative.  Negative for color change and rash.   Allergic/Immunologic: Positive for environmental allergies and food allergies.   Neurological: Negative.    Hematological: Negative.    Psychiatric/Behavioral: Negative.          PHYSICAL EXAMINATION     Physical Exam  Constitutional:       General: He is not in acute distress.     Appearance: He is well-developed. He is obese. He is not ill-appearing.   HENT:      Head: Normocephalic and atraumatic.      Right Ear: Tympanic membrane, ear canal and external ear normal.      Left Ear: Tympanic membrane, ear canal and external ear normal.   Eyes:      General: No scleral icterus.     Conjunctiva/sclera: Conjunctivae normal.      Pupils: Pupils are equal, round, and reactive to light.   Neck:      Thyroid: No thyroid mass or thyromegaly.      Vascular: No carotid bruit.       Trachea: No tracheal deviation.   Cardiovascular:      Rate and Rhythm: Normal rate and regular rhythm.      Pulses: Normal pulses.      Heart sounds: Normal heart sounds.      Comments: No carotid bruit  Pulmonary:      Effort: Pulmonary effort is normal.      Breath sounds: Normal breath sounds.   Chest:   Breasts:      Right: No supraclavicular adenopathy.      Left: No supraclavicular adenopathy.       Abdominal:      General: There is no distension.      Palpations: Abdomen is soft. There is no mass.      Tenderness: There is no abdominal tenderness. There is no guarding or rebound.      Hernia: No hernia is present.   Musculoskeletal:      Cervical back: Neck supple.      Right lower leg: No edema.      Left lower leg: No edema.   Lymphadenopathy:      Cervical: No cervical adenopathy.      Upper Body:      Right upper body: No supraclavicular adenopathy.      Left upper body: No supraclavicular adenopathy.   Skin:     General: Skin is warm.      Coloration: Skin is not jaundiced or pale.      Findings: No lesion (Negative for suspicious skin lesions/growths) or rash.      Comments: No jaundice  No suspicious skin lesions.    Neurological:      Mental Status: He is alert and oriented to person, place, and time.      Cranial Nerves: No cranial nerve deficit.      Motor: No abnormal muscle tone.      Deep Tendon Reflexes: Reflexes normal.   Psychiatric:         Mood and Affect: Mood normal.         Behavior: Behavior normal.         Thought Content: Thought content normal.         Judgment: Judgment normal.         REVIEWED DATA      Labs:    Lab Results   Component Value Date     07/29/2022    K 3.9 07/29/2022    CALCIUM 10.1 07/29/2022    AST 20 07/26/2022    ALT 24 07/26/2022    BUN 47 (H) 07/29/2022    CREATININE 2.17 (H) 07/29/2022    CREATININE 1.12 07/26/2022    CREATININE 1.09 09/27/2021    EGFRIFNONA 71 09/27/2021    EGFRIFAFRI 86 09/27/2021       Lab Results   Component Value Date    GLUCOSE 106  (H) 07/29/2022    HGBA1C 5.8 (H) 07/26/2022    HGBA1C 6.1 (H) 02/14/2022    HGBA1C 5.7 09/27/2021    TSH 1.250 07/26/2022    FREET4 1.48 07/26/2022       Lab Results   Component Value Date    PSA 3.1 07/26/2022    PSA 2.29 12/15/2014       No results found for: TESTOSTERONE, TESTOSTEROTT, TESTFRE    Lab Results   Component Value Date    LDL 78 07/26/2022    HDL 47 07/26/2022    TRIG 62 07/26/2022    CHOLHDLRATIO 2.9 07/26/2022       No components found for: [id number]    Lab Results   Component Value Date    WBC 7.1 07/26/2022    HGB 15.6 07/26/2022    MCV 94 07/26/2022     07/26/2022       Lab Results   Component Value Date    PROTEIN Trace 07/26/2022    GLUCOSEU 3+ (A) 07/26/2022    BLOODU Negative 07/26/2022    NITRITEU Negative 07/26/2022    LEUKOCYTESUR Negative 07/26/2022          Lab Results   Component Value Date    HEPCVIRUSABY <0.1 12/31/2020       Imaging:           Medical Tests:           ASSESSMENT & PLAN     ANNUAL WELLNESS EXAM / PHYSICAL     Other medical problems addressed today:  Problem List Items Addressed This Visit        High    Type 2 diabetes mellitus without complication (HCC) (Chronic)    Relevant Medications    Blood Glucose Monitoring Suppl (ONE TOUCH ULTRA 2) w/Device kit    glucose blood (OneTouch Ultra) test strip    Lancets (onetouch ultrasoft) lancets    valsartan-hydrochlorothiazide (DIOVAN-HCT) 320-12.5 MG per tablet    Ozempic, 0.25 or 0.5 MG/DOSE, 2 MG/1.5ML solution pen-injector    metFORMIN ER (GLUCOPHAGE-XR) 500 MG 24 hr tablet    Farxiga 5 MG tablet tablet       Medium    Hypertension (Chronic)    Relevant Medications    valsartan-hydrochlorothiazide (DIOVAN-HCT) 320-12.5 MG per tablet      Other Visit Diagnoses     Encounter for annual health examination    -  Primary    Relevant Orders    Varicella zoster antibody, IgG    Acute renal insufficiency        Relevant Orders    Basic Metabolic Panel    Multiple food allergies        Relevant Orders    Ambulatory Referral  to Allergy (Completed)    Immunity status testing        Relevant Orders    Varicella zoster antibody, IgG          Summary/Discussion:       Primary reason for today's visit was for annual health examination. However above active medical issues also needed to be addressed today.      · Acute renal insufficiency probably prerenal in origin due to dehydration. Will need to recheck lab to verify it is improving.   · Advised to hold metformin and Farxiga for now until labs are resulted and instructed to resume   · Push hydration and avoid any OTC NSAIDS   · Monitor glucose closely while holding diabetic medications     Next Appointment with me: 12/14/2022    Return in about 4 months (around 12/2/2022) for Diabetes, POCT A1C.      HEALTHCARE MAINTENANCE ISSUES       Cancer Screening:  · Colon: Initial/Next screening at age: -Cologuard  · Repeat colon cancer screening: every 3 years  · Prostate: scheduled for 2nd biopsy   · Testicular: Recommended monthly self exam  · Skin: Monthly self skin examination, annual exam by health professional  · Lung: Does not meet criteria for lung cancer screening.   · Other:    Screening Labs & Tests:  · Lab results reviewed & discussed with with patient or orders placed today.  · EKG:  · CV Screening: Lipid panel  · DEXA (75+ or risk factors):   · HEP C (If born [id number] or risk factors): Previously had negative screen  · Other:     Immunization/Vaccinations (to be given today unless deferred by patient)  · Influenza: Recommended annual influenza vaccine  · Hepatitis A: Up to date  · Hepatitis B: Up to date  · Tetanus/Pertussis: Up to date  · Pneumovax/PCV: Up to date  · Shingles: Check varicella IgG  · COVID: Completed primary vaccine series and 1st/2nd boosters  Lifestyle Counseling:  · Lifestyle Modifications: Attempt to lose weight, Improve dietary compliance, Begin progressive aerobic exercise program 3-5 days a week, Maintain a low sugar/carbohydrate diet and Follow a low fat, low  cholesterol diet  · Safety Issues: Always wear seatbelt, Avoid texting while driving   · Use sunscreen, regular skin examination  · Recommended annual dental/vision examination.  · Emotional/Stress/Sleep: Reviewed and  given when appropriate      Health Maintenance   Topic Date Due   • DIABETIC EYE EXAM  09/13/2022   • URINE MICROALBUMIN  09/27/2022   • INFLUENZA VACCINE  10/01/2022   • HEMOGLOBIN A1C  01/26/2023   • DIABETIC FOOT EXAM  02/14/2023   • LIPID PANEL  07/26/2023   • ANNUAL PHYSICAL  08/03/2023   • COLORECTAL CANCER SCREENING  03/02/2025   • TDAP/TD VACCINES (3 - Td or Tdap) 09/09/2027   • Pneumococcal Vaccine 0-64 (3 - PPSV23 or PCV20) 11/21/2033   • HEPATITIS C SCREENING  Completed   • Hepatitis B  Completed   • COVID-19 Vaccine  Completed   • ZOSTER VACCINE  Discontinued           *Examiner was wearing KN95 mask and eye protection during the entire duration of the visit. Patient was masked the entire time. Minimum social distance of 6 ft maintained entire visit except if physical contact was necessary as documented.       Template created by Derrell Gray MD

## 2022-08-03 ENCOUNTER — TELEPHONE (OUTPATIENT)
Dept: INTERNAL MEDICINE | Age: 52
End: 2022-08-03

## 2022-08-03 DIAGNOSIS — N28.9 ACUTE RENAL INSUFFICIENCY: Primary | ICD-10-CM

## 2022-08-03 LAB
BUN SERPL-MCNC: 26 MG/DL (ref 6–24)
BUN/CREAT SERPL: 18 (ref 9–20)
CALCIUM SERPL-MCNC: 10 MG/DL (ref 8.7–10.2)
CHLORIDE SERPL-SCNC: 99 MMOL/L (ref 96–106)
CO2 SERPL-SCNC: 28 MMOL/L (ref 20–29)
CREAT SERPL-MCNC: 1.48 MG/DL (ref 0.76–1.27)
EGFRCR SERPLBLD CKD-EPI 2021: 56 ML/MIN/1.73
GLUCOSE SERPL-MCNC: 97 MG/DL (ref 65–99)
POTASSIUM SERPL-SCNC: 4.2 MMOL/L (ref 3.5–5.2)
SODIUM SERPL-SCNC: 141 MMOL/L (ref 134–144)
VZV IGG SER IA-ACNC: <135 INDEX

## 2022-08-03 NOTE — TELEPHONE ENCOUNTER
Caller: Mega Santiago    Relationship: Self    Best call back number: [phone]    What is the best time to reach you: ANY    Who are you requesting to speak with (clinical staff, provider,  specific staff member): DR. LAGUNA/MA     Do you know the name of the person who called: DR. LAGUNA     What was the call regarding: PATIENT TRYING TO RETURN CALL TO DR. LAGUNA, HE WAS TOLD TO STOP TAKING ALL OF HIS MEDICATIONS AND IS TRYING TO FIND OUT WHEN HE IS SUPPOSED TO START TAKING THEM AGAIN. HE HAD LABS DONE YESTERDAY BECAUSE HE HAS BEEN TOO DEHYDRATED.     Do you require a callback: YES

## 2022-08-03 NOTE — PROGRESS NOTES
MyChart:    Here are the result(s) of your test(s):     1. My MA talked to you already about your kidney test result.   2. Chicken pox titer is NOT CONSISTENT WITH IMMUNITY. This means you should consider the chicken pox vaccine instead of shingles vaccine.     Please do not hesitate to contact me if you have questions.

## 2022-08-03 NOTE — TELEPHONE ENCOUNTER
Caller: Mega Santiago    Relationship: Self    Best call back number:     Caller requesting test results:     What test was performed: LAB    When was the test performed: 08/03/22    Where was the test performed: OFFICE LAB    Additional notes: PATIENT IS CALLING IN TO GET HIS LAB RESULTS FROM YESTERDAY AS HE WAS TOLD TO STOP TAKING A MEDICATION UNTIL THE RESULTS CAME BACK.  HE SAYS HE HAS LOOKED ON MYCHART AND THE RESULTS DONT SHOW.

## 2022-08-05 ENCOUNTER — TELEPHONE (OUTPATIENT)
Dept: INTERNAL MEDICINE | Age: 52
End: 2022-08-05

## 2022-08-05 NOTE — TELEPHONE ENCOUNTER
----- Message from Funmilayo Santos MA sent at 8/5/2022  9:27 AM EDT -----  Regarding: FW: BP rising    ----- Message -----  From: Mega Santiago  Sent: 8/5/2022   6:51 AM EDT  To: [facility]  Subject: BP rising                                        Goodmorning Dr Gray,  My BP is starting to rise since yesterday, accompanied by light headiness.  No vomiting or diarrhea to-date. Light bland diet still and drinking lots of fluids.    These readings were taken when I first work up in the morning:  Aug 4th - 137/88  Pulse72  Glucose 91  Aug 5th - 124/91  Pulse78  Glucose 93    I went ahead and took my BP med this morning.  I'm hoping that you agree as well.  Let me know otherwise if I need to stop again.  Thank you and have a great weekend ahead.    Clarence Santiago

## 2022-08-05 NOTE — TELEPHONE ENCOUNTER
Dr. Marina Dhillon is currently out of the office.  I reviewed his messages and it appears that you should be holding your two medications until a lab recheck, which is scheduled for Tuesday.    Please see his message below from 08/03, and let us know if any questions.    Kidney function is improving but not at baseline. Continue holding those two medications. Recheck lab on Monday/Tuesday. If normal then resume medications. I will place the lab orders. Schedule lab appointment.

## 2022-08-09 ENCOUNTER — TELEPHONE (OUTPATIENT)
Dept: INTERNAL MEDICINE | Age: 52
End: 2022-08-09

## 2022-08-09 NOTE — TELEPHONE ENCOUNTER
Pt advised he was previously instructed to hold BP medication, along with diabetes medications.  He is seeking clarification on whether he can resume his BP medication, because of concern for elevated readings.  Pt advised he may resume BP medication as prescribed.  Pt reminded of importance of following up as scheduled for lab appointment on Tuesday for recheck of kidney function.  Pt acknowledged understanding.

## 2022-09-28 DIAGNOSIS — E11.9 TYPE 2 DIABETES MELLITUS WITHOUT COMPLICATION, WITHOUT LONG-TERM CURRENT USE OF INSULIN: Chronic | ICD-10-CM

## 2022-09-28 RX ORDER — VALSARTAN AND HYDROCHLOROTHIAZIDE 320; 12.5 MG/1; MG/1
TABLET, FILM COATED ORAL
Qty: 90 TABLET | Refills: 3 | Status: SHIPPED | OUTPATIENT
Start: 2022-09-28 | End: 2023-02-15 | Stop reason: ALTCHOICE

## 2022-11-11 ENCOUNTER — LAB (OUTPATIENT)
Dept: LAB | Facility: HOSPITAL | Age: 52
End: 2022-11-11

## 2022-11-11 ENCOUNTER — TRANSCRIBE ORDERS (OUTPATIENT)
Dept: CARDIOLOGY | Facility: HOSPITAL | Age: 52
End: 2022-11-11

## 2022-11-11 ENCOUNTER — TRANSCRIBE ORDERS (OUTPATIENT)
Dept: ADMINISTRATIVE | Facility: HOSPITAL | Age: 52
End: 2022-11-11

## 2022-11-11 ENCOUNTER — HOSPITAL ENCOUNTER (OUTPATIENT)
Dept: CARDIOLOGY | Facility: HOSPITAL | Age: 52
Discharge: HOME | End: 2022-11-11

## 2022-11-11 DIAGNOSIS — Z01.811 PRE-OP CHEST EXAM: Primary | ICD-10-CM

## 2022-11-11 DIAGNOSIS — C61 MALIGNANT NEOPLASM OF PROSTATE: Primary | ICD-10-CM

## 2022-11-11 DIAGNOSIS — C61 MALIGNANT NEOPLASM OF PROSTATE: ICD-10-CM

## 2022-11-11 LAB
ANION GAP SERPL CALCULATED.3IONS-SCNC: 7 MMOL/L (ref 5–15)
BASOPHILS # BLD AUTO: 0.08 10*3/MM3 (ref 0–0.2)
BASOPHILS NFR BLD AUTO: 0.9 % (ref 0–1.5)
BUN SERPL-MCNC: 2 MG/DL (ref 6–20)
BUN/CREAT SERPL: 1.5 (ref 7–25)
CALCIUM SPEC-SCNC: 9.2 MG/DL (ref 8.6–10.5)
CHLORIDE SERPL-SCNC: 103 MMOL/L (ref 98–107)
CO2 SERPL-SCNC: 29 MMOL/L (ref 22–29)
CREAT SERPL-MCNC: 1.34 MG/DL (ref 0.76–1.27)
DEPRECATED RDW RBC AUTO: 43.1 FL (ref 37–54)
EGFRCR SERPLBLD CKD-EPI 2021: 63.3 ML/MIN/1.73
EOSINOPHIL # BLD AUTO: 0.14 10*3/MM3 (ref 0–0.4)
EOSINOPHIL NFR BLD AUTO: 1.6 % (ref 0.3–6.2)
ERYTHROCYTE [DISTWIDTH] IN BLOOD BY AUTOMATED COUNT: 12.3 % (ref 12.3–15.4)
GLUCOSE SERPL-MCNC: 98 MG/DL (ref 65–99)
HCT VFR BLD AUTO: 45.3 % (ref 37.5–51)
HGB BLD-MCNC: 14.7 G/DL (ref 13–17.7)
IMM GRANULOCYTES # BLD AUTO: 0.02 10*3/MM3 (ref 0–0.05)
IMM GRANULOCYTES NFR BLD AUTO: 0.2 % (ref 0–0.5)
LYMPHOCYTES # BLD AUTO: 3.48 10*3/MM3 (ref 0.7–3.1)
LYMPHOCYTES NFR BLD AUTO: 39.8 % (ref 19.6–45.3)
MCH RBC QN AUTO: 31.1 PG (ref 26.6–33)
MCHC RBC AUTO-ENTMCNC: 32.5 G/DL (ref 31.5–35.7)
MCV RBC AUTO: 95.8 FL (ref 79–97)
MONOCYTES # BLD AUTO: 0.8 10*3/MM3 (ref 0.1–0.9)
MONOCYTES NFR BLD AUTO: 9.2 % (ref 5–12)
NEUTROPHILS NFR BLD AUTO: 4.22 10*3/MM3 (ref 1.7–7)
NEUTROPHILS NFR BLD AUTO: 48.3 % (ref 42.7–76)
NRBC BLD AUTO-RTO: 0 /100 WBC (ref 0–0.2)
PLATELET # BLD AUTO: 357 10*3/MM3 (ref 140–450)
PMV BLD AUTO: 8.9 FL (ref 6–12)
POTASSIUM SERPL-SCNC: 3.6 MMOL/L (ref 3.5–5.2)
QT INTERVAL: 392 MS
RBC # BLD AUTO: 4.73 10*6/MM3 (ref 4.14–5.8)
SODIUM SERPL-SCNC: 139 MMOL/L (ref 136–145)
WBC NRBC COR # BLD: 8.74 10*3/MM3 (ref 3.4–10.8)

## 2022-11-11 PROCEDURE — 93005 ELECTROCARDIOGRAM TRACING: CPT

## 2022-11-11 PROCEDURE — 85025 COMPLETE CBC W/AUTO DIFF WBC: CPT | Performed by: UROLOGY

## 2022-11-11 PROCEDURE — 36415 COLL VENOUS BLD VENIPUNCTURE: CPT

## 2022-11-11 PROCEDURE — 80048 BASIC METABOLIC PNL TOTAL CA: CPT

## 2022-11-11 PROCEDURE — 93010 ELECTROCARDIOGRAM REPORT: CPT | Performed by: INTERNAL MEDICINE

## 2022-12-10 DIAGNOSIS — E11.9 TYPE 2 DIABETES MELLITUS WITHOUT COMPLICATION, WITHOUT LONG-TERM CURRENT USE OF INSULIN: Chronic | ICD-10-CM

## 2022-12-12 RX ORDER — DAPAGLIFLOZIN 5 MG/1
TABLET, FILM COATED ORAL
Qty: 30 TABLET | Refills: 11 | Status: SHIPPED | OUTPATIENT
Start: 2022-12-12 | End: 2023-02-10 | Stop reason: SDUPTHER

## 2023-02-10 ENCOUNTER — OFFICE VISIT (OUTPATIENT)
Dept: INTERNAL MEDICINE | Age: 53
End: 2023-02-10
Payer: COMMERCIAL

## 2023-02-10 VITALS
HEIGHT: 66 IN | OXYGEN SATURATION: 99 % | WEIGHT: 203 LBS | DIASTOLIC BLOOD PRESSURE: 86 MMHG | SYSTOLIC BLOOD PRESSURE: 124 MMHG | HEART RATE: 87 BPM | TEMPERATURE: 97.3 F | BODY MASS INDEX: 32.62 KG/M2

## 2023-02-10 DIAGNOSIS — E11.9 TYPE 2 DIABETES MELLITUS WITHOUT COMPLICATION, WITHOUT LONG-TERM CURRENT USE OF INSULIN: Primary | Chronic | ICD-10-CM

## 2023-02-10 DIAGNOSIS — I10 PRIMARY HYPERTENSION: Chronic | ICD-10-CM

## 2023-02-10 DIAGNOSIS — E78.2 MIXED HYPERLIPIDEMIA: Chronic | ICD-10-CM

## 2023-02-10 DIAGNOSIS — C61 PROSTATE CANCER: Chronic | ICD-10-CM

## 2023-02-10 DIAGNOSIS — N18.30 STAGE 3 CHRONIC KIDNEY DISEASE, UNSPECIFIED WHETHER STAGE 3A OR 3B CKD: ICD-10-CM

## 2023-02-10 LAB
ALBUMIN SERPL-MCNC: 4.7 G/DL (ref 3.5–5.2)
ALBUMIN/GLOB SERPL: 1.9 G/DL
ALP SERPL-CCNC: 93 U/L (ref 39–117)
ALT SERPL-CCNC: 21 U/L (ref 1–41)
AST SERPL-CCNC: 19 U/L (ref 1–40)
BILIRUB SERPL-MCNC: 0.4 MG/DL (ref 0–1.2)
BUN SERPL-MCNC: 20 MG/DL (ref 6–20)
BUN/CREAT SERPL: 14.6 (ref 7–25)
CALCIUM SERPL-MCNC: 9.3 MG/DL (ref 8.6–10.5)
CHLORIDE SERPL-SCNC: 103 MMOL/L (ref 98–107)
CO2 SERPL-SCNC: 25.9 MMOL/L (ref 22–29)
CREAT SERPL-MCNC: 1.37 MG/DL (ref 0.76–1.27)
EGFRCR SERPLBLD CKD-EPI 2021: 61.3 ML/MIN/1.73
GLOBULIN SER CALC-MCNC: 2.5 GM/DL
GLUCOSE SERPL-MCNC: 100 MG/DL (ref 65–99)
HBA1C MFR BLD: 5.7 % (ref 4.8–5.6)
POTASSIUM SERPL-SCNC: 4 MMOL/L (ref 3.5–5.2)
PROT SERPL-MCNC: 7.2 G/DL (ref 6–8.5)
SODIUM SERPL-SCNC: 139 MMOL/L (ref 136–145)

## 2023-02-10 PROCEDURE — 99214 OFFICE O/P EST MOD 30 MIN: CPT | Performed by: INTERNAL MEDICINE

## 2023-02-10 RX ORDER — METFORMIN HYDROCHLORIDE 500 MG/1
1000 TABLET, EXTENDED RELEASE ORAL 2 TIMES DAILY
Qty: 360 TABLET | Refills: 3 | Status: SHIPPED | OUTPATIENT
Start: 2023-02-10

## 2023-02-10 RX ORDER — SEMAGLUTIDE 1.34 MG/ML
1 INJECTION, SOLUTION SUBCUTANEOUS WEEKLY
Qty: 1.5 ML | Refills: 5 | Status: SHIPPED | OUTPATIENT
Start: 2023-02-10

## 2023-02-10 RX ORDER — DAPAGLIFLOZIN 5 MG/1
5 TABLET, FILM COATED ORAL DAILY
Qty: 90 TABLET | Refills: 3 | Status: SHIPPED | OUTPATIENT
Start: 2023-02-10 | End: 2023-02-27 | Stop reason: DRUGHIGH

## 2023-02-10 RX ORDER — ATORVASTATIN CALCIUM 40 MG/1
40 TABLET, FILM COATED ORAL DAILY
Qty: 90 TABLET | Refills: 3 | Status: SHIPPED | OUTPATIENT
Start: 2023-02-10

## 2023-02-10 NOTE — PROGRESS NOTES
I N T E R N A L  M E D I C I N E    J U N O H  K I M,  M D      ENCOUNTER DATE:  02/10/2023    Mega Santiago / 52 y.o. / male    CHIEF COMPLAINT / REASON FOR OFFICE VISIT     Diabetes      ASSESSMENT & PLAN     Problem List Items Addressed This Visit        High    Type 2 diabetes mellitus without complication (HCC) - Primary (Chronic)    Current Assessment & Plan     Has been stressed due to mom's recent diagnosis of PD with dementia. Spent a month in [facility].     He ran out of Farxiga 2 weeks ago.  Prescription sent to the pharmacy.  He was advised to resume Farxiga 5 mg daily and continue metformin  mg 2 tablets twice daily.  Will increase Ozempic to 1 mg weekly.  Check A1c level today.  Continue to try to follow a low carbohydrate/diabetic diet.  Try to increase regular physical activity.     Lab Results   Component Value Date    HGBA1C 5.8 (H) 07/26/2022    HGBA1C 6.1 (H) 02/14/2022    HGBA1C 5.7 09/27/2021             Relevant Medications    Blood Glucose Monitoring Suppl (ONE TOUCH ULTRA 2) w/Device kit    glucose blood (OneTouch Ultra) test strip    Lancets (onetouch ultrasoft) lancets    valsartan-hydrochlorothiazide (DIOVAN-HCT) 320-12.5 MG per tablet    Semaglutide, 1 MG/DOSE, (Ozempic, 1 MG/DOSE,) 2 MG/1.5ML solution pen-injector    dapagliflozin (Farxiga) 5 MG tablet tablet    metFORMIN ER (GLUCOPHAGE-XR) 500 MG 24 hr tablet    Other Relevant Orders    Comprehensive Metabolic Panel    Hemoglobin A1c       Medium    Hyperlipidemia (Chronic)    Overview     Continue atorvastatin 40 mg qd.          Relevant Medications    atorvastatin (LIPITOR) 40 MG tablet    Hypertension (Chronic)    Overview     Continue valsartan hydrochlorothiazide 320-12.5 mg daily         Relevant Medications    valsartan-hydrochlorothiazide (DIOVAN-HCT) 320-12.5 MG per tablet       Low    Prostate cancer (HCC) (Chronic)    Overview     *Seen at [facility] [place]: planned 1 year observation, no treatment  "at this time    1/2023: biopsies negative for cancer         Current Assessment & Plan     Most recent prostate biopsies were negative for malignancy.  Continue to follow-up with urology as instructed.          Orders Placed This Encounter   Procedures   • Comprehensive Metabolic Panel   • Hemoglobin A1c     New Medications Ordered This Visit   Medications   • Semaglutide, 1 MG/DOSE, (Ozempic, 1 MG/DOSE,) 2 MG/1.5ML solution pen-injector     Sig: Inject 1 mg under the skin into the appropriate area as directed 1 (One) Time Per Week.     Dispense:  1.5 mL     Refill:  5   • dapagliflozin (Farxiga) 5 MG tablet tablet     Sig: Take 1 tablet by mouth Daily.     Dispense:  90 tablet     Refill:  3   • atorvastatin (LIPITOR) 40 MG tablet     Sig: Take 1 tablet by mouth Daily.     Dispense:  90 tablet     Refill:  3   • metFORMIN ER (GLUCOPHAGE-XR) 500 MG 24 hr tablet     Sig: Take 2 tablets by mouth 2 (Two) Times a Day.     Dispense:  360 tablet     Refill:  3       SUMMARY/DISCUSSION  •       Next Appointment with me: Visit date not found    Return in about 5 months (around 7/10/2023).        VITAL SIGNS     Vitals:    02/10/23 0740   BP: 124/86   Pulse: 87   Temp: 97.3 °F (36.3 °C)   SpO2: 99%   Weight: 92.1 kg (203 lb)   Height: 167.6 cm (65.98\")       BP Readings from Last 3 Encounters:   02/10/23 124/86   08/02/22 110/78   07/29/22 122/72     Wt Readings from Last 3 Encounters:   02/10/23 92.1 kg (203 lb)   08/02/22 88.9 kg (196 lb)   07/29/22 88.2 kg (194 lb 6.4 oz)     Body mass index is 32.78 kg/m².    Blood pressure readings recorded on patient flowsheet:  No flowsheet data found.     MEDICATIONS AT THE TIME OF OFFICE VISIT     Current Outpatient Medications on File Prior to Visit   Medication Sig   • atorvastatin (LIPITOR) 40 MG tablet TAKE ONE TABLET BY MOUTH DAILY   • Blood Glucose Monitoring Suppl (ONE TOUCH ULTRA 2) w/Device kit Check sugar daily.   • cetirizine (zyrTEC) 10 MG tablet Take 10 mg by mouth " Every Night.   • Cholecalciferol (VITAMIN D) 2000 UNITS capsule Take 1 capsule by mouth.   • Farxiga 5 MG tablet tablet TAKE ONE TABLET BY MOUTH DAILY   • glucose blood (OneTouch Ultra) test strip Use as instructed   • Lancets (onetouch ultrasoft) lancets Use as instructed   • metFORMIN ER (GLUCOPHAGE-XR) 500 MG 24 hr tablet TAKE TWO TABLETS BY MOUTH TWICE A DAY   • omeprazole (priLOSEC) 20 MG capsule Take 20 mg by mouth 2 (Two) Times a Day.   • Ozempic, 0.25 or 0.5 MG/DOSE, 2 MG/1.5ML solution pen-injector DIAL AND INJECT UNDER THE SKIN 0.5 MG WEEKLY   • valsartan-hydrochlorothiazide (DIOVAN-HCT) 320-12.5 MG per tablet TAKE ONE TABLET BY MOUTH DAILY     No current facility-administered medications on file prior to visit.         HISTORY OF PRESENT ILLNESS     Recent prostate core biopsy was negative for cancer.     Mom was diagnosed with with PD in [facility] and he had a stressful time helping her there for about a month.  He has noted some weight gain due to change in his diet and stress.  He ran out of Farxiga couple weeks ago and will be running out of Ozempic soon.  He denies significant side effects from medications.  He has not been monitoring his glucose closely.  Blood pressure and cholesterol remains stable on medications and he reports compliance with these meds.     Lab Results   Component Value Date    HGBA1C 5.8 (H) 07/26/2022    HGBA1C 6.1 (H) 02/14/2022    HGBA1C 5.7 09/27/2021    CREATININE 1.34 (H) 11/11/2022    LDL 78 07/26/2022    MALBCRERATIO 5 09/27/2021     Blood sugar readings recorded on patient's flowsheet:  No flowsheet data found.    92.1 kg (203 lb)    REVIEW OF SYSTEMS     Weight gain   Recent high stress due to mom's medical problems  No chest pain or shortness of breath   GI negative    prostate biopsy negative for cancer       PHYSICAL EXAMINATION     Physical Exam  General: No acute distress; weight gain noted   Psych: Normal thought and judgment   Cardiovascular Rate: normal.  Rhythm: regular. Heart sounds: normal   Pulm/Chest: Effort normal, breath sounds normal.       REVIEWED DATA     Labs:     Lab Results   Component Value Date     11/11/2022    K 3.6 11/11/2022    CALCIUM 9.2 11/11/2022    AST 20 07/26/2022    ALT 24 07/26/2022    BUN 2 (L) 11/11/2022    CREATININE 1.34 (H) 11/11/2022    CREATININE 1.15 08/09/2022    CREATININE 1.48 (H) 08/02/2022    EGFRIFNONA 71 09/27/2021    EGFRIFAFRI 86 09/27/2021       Lab Results   Component Value Date    HGBA1C 5.8 (H) 07/26/2022    HGBA1C 6.1 (H) 02/14/2022    HGBA1C 5.7 09/27/2021       Lab Results   Component Value Date    LDL 78 07/26/2022    LDL 58 09/27/2021    LDL 53 12/31/2020    HDL 47 07/26/2022    HDL 45 09/27/2021    TRIG 62 07/26/2022    TRIG 70 09/27/2021       Lab Results   Component Value Date    TSH 1.250 07/26/2022    TSH 2.070 02/14/2022    TSH 1.900 09/12/2019    FREET4 1.48 07/26/2022    FREET4 1.43 02/14/2022    FREET4 1.49 09/12/2019       Lab Results   Component Value Date    WBC 8.74 11/11/2022    HGB 14.7 11/11/2022     11/11/2022       Lab Results   Component Value Date    MALBCRERATIO 5 09/27/2021          Imaging:           Medical Tests:           Summary of old records / correspondence / consultant report:     Urology note 1/9/23:        Request outside records:           *Examiner was wearing KN95 mask and eye protection during the entire duration of the visit. Patient was masked the entire time. Minimum social distance of 6 ft maintained entire visit except if physical contact was necessary as documented.       Template created by Derrell Gray MD

## 2023-02-10 NOTE — ASSESSMENT & PLAN NOTE
Has been stressed due to mom's recent diagnosis of PD with dementia. Spent a month in [facility].     He ran out of Farxiga 2 weeks ago.  Prescription sent to the pharmacy.  He was advised to resume Farxiga 5 mg daily and continue metformin  mg 2 tablets twice daily.  Will increase Ozempic to 1 mg weekly.  Check A1c level today.  Continue to try to follow a low carbohydrate/diabetic diet.  Try to increase regular physical activity.     Lab Results   Component Value Date    HGBA1C 5.8 (H) 07/26/2022    HGBA1C 6.1 (H) 02/14/2022    HGBA1C 5.7 09/27/2021

## 2023-02-10 NOTE — ASSESSMENT & PLAN NOTE
Most recent prostate biopsies were negative for malignancy.  Continue to follow-up with urology as instructed.

## 2023-02-13 NOTE — PROGRESS NOTES
CALL PATIENT WITH TEST RESULTS:  - Be sure to communicate DIRECTLY with the patient/surrogate EVEN IF the result(s) has been released or viewed by the patient on [employer]) .  - Also, mail the results IF [employer] is NOT active.      1. A1c for average glucose level is stable overall.     2. Kidney function is still not at baseline.   - it may be due to one or two of his medications  - discontinue valsartan HCT and start valsartan 320 mg qd (stopping HCTZ) and see if it improves. Recheck BMP in 1 month. Call if blood pressure is consistently  > 135/85.   - avoid any OTC NSAIDS, maintain good hydration with water all times    3. See me in 3 months for diabetes and hypertension  - cancel any other future appointment unless it's for preventative visit

## 2023-02-15 DIAGNOSIS — I10 PRIMARY HYPERTENSION: Primary | ICD-10-CM

## 2023-02-15 DIAGNOSIS — E11.9 TYPE 2 DIABETES MELLITUS WITHOUT COMPLICATION, WITHOUT LONG-TERM CURRENT USE OF INSULIN: Chronic | ICD-10-CM

## 2023-02-15 RX ORDER — VALSARTAN 320 MG/1
320 TABLET ORAL DAILY
Qty: 30 TABLET | Refills: 3 | Status: SHIPPED | OUTPATIENT
Start: 2023-02-15 | End: 2023-03-07

## 2023-02-15 NOTE — ASSESSMENT & PLAN NOTE
Creatinine is not at baseline.  He is currently taking valsartan hydrochlorothiazide 320/12.5 mg daily.  Discontinue hydrochlorothiazide portion and take valsartan 320 mg daily.  Call for blood pressure greater than 135/85.  Recheck BMP 1 month.    Lab Results   Component Value Date    CREATININE 1.37 (H) 02/10/2023    CREATININE 1.34 (H) 11/11/2022    CREATININE 1.15 08/09/2022    CREATININE 1.48 (H) 08/02/2022    CREATININE 2.17 (H) 07/29/2022    BUN 20 02/10/2023    EGFRIFNONA 71 09/27/2021    EGFRIFAFRI 86 09/27/2021

## 2023-02-24 DIAGNOSIS — I10 PRIMARY HYPERTENSION: Primary | Chronic | ICD-10-CM

## 2023-02-24 RX ORDER — AMLODIPINE BESYLATE 5 MG/1
5 TABLET ORAL NIGHTLY
Qty: 30 TABLET | Refills: 3 | Status: SHIPPED | OUTPATIENT
Start: 2023-02-24 | End: 2023-02-27 | Stop reason: SDUPTHER

## 2023-02-24 NOTE — ASSESSMENT & PLAN NOTE
Home blood pressure readings are high since discontinuing hydrochlorothiazide.  Add amlodipine 5 mg nightly.  Continue valsartan 320 mg daily.  Send blood pressure readings in 1 to 2 weeks.

## 2023-02-27 DIAGNOSIS — I10 PRIMARY HYPERTENSION: Chronic | ICD-10-CM

## 2023-02-27 DIAGNOSIS — E11.9 TYPE 2 DIABETES MELLITUS WITHOUT COMPLICATION, WITHOUT LONG-TERM CURRENT USE OF INSULIN: Chronic | ICD-10-CM

## 2023-02-27 RX ORDER — AMLODIPINE BESYLATE 5 MG/1
5 TABLET ORAL NIGHTLY
Qty: 30 TABLET | Refills: 3 | Status: SHIPPED | OUTPATIENT
Start: 2023-02-27

## 2023-02-27 RX ORDER — DAPAGLIFLOZIN 10 MG/1
10 TABLET, FILM COATED ORAL EVERY MORNING
Qty: 30 TABLET | Refills: 3 | Status: SHIPPED | OUTPATIENT
Start: 2023-02-27

## 2023-02-27 NOTE — TELEPHONE ENCOUNTER
----- Message from Alber Gray MD sent at 2/27/2023 12:40 PM EST -----  Regarding: Home BP is high.  Contact: [phone]  Verify compliance with blood pressure medications (valsartan and amlodipine).  Maintain low sodium diet < 2500 mg / day.  Advise to increase Farxiga to 10 mg qAM (will help both diabetes and hypertension).  Send order(s) to me for authorization if agreeable.   Send blood pressure readings in 2 weeks.       ----- Message -----  From: Mega Santiago  Sent: 2/27/2023   8:18 AM EST  To: Alber Gray MD  Subject: Ozempic - [facility] out-of-stock - no ETA        BP reading this weekend was on the higher end, including this morning.  Feb 25 - 141/92  Feb 26 - 144/99  Feb 27 - 139/90

## 2023-02-27 NOTE — TELEPHONE ENCOUNTER
Spoke with pt   He did not  rx for amlodipine yet . Was sent to wrong pharmacy     rx attached for both amlodipine and farxiga

## 2023-03-07 DIAGNOSIS — I10 PRIMARY HYPERTENSION: Chronic | ICD-10-CM

## 2023-03-07 DIAGNOSIS — E11.9 TYPE 2 DIABETES MELLITUS WITHOUT COMPLICATION, WITHOUT LONG-TERM CURRENT USE OF INSULIN: Primary | Chronic | ICD-10-CM

## 2023-03-07 RX ORDER — OLMESARTAN MEDOXOMIL 40 MG/1
40 TABLET ORAL DAILY
Qty: 30 TABLET | Refills: 3 | Status: SHIPPED | OUTPATIENT
Start: 2023-03-07 | End: 2023-03-17 | Stop reason: SDUPTHER

## 2023-03-07 NOTE — ASSESSMENT & PLAN NOTE
Blood pressure remains elevated at home. Change valsartan 320 mg to olmesartan 40 mg qd. Send blood pressure in 2 weeks.

## 2023-03-10 DIAGNOSIS — N18.30 STAGE 3 CHRONIC KIDNEY DISEASE, UNSPECIFIED WHETHER STAGE 3A OR 3B CKD: ICD-10-CM

## 2023-03-13 LAB
BUN SERPL-MCNC: 16 MG/DL (ref 6–20)
BUN/CREAT SERPL: 13.8 (ref 7–25)
CALCIUM SERPL-MCNC: 9.3 MG/DL (ref 8.6–10.5)
CHLORIDE SERPL-SCNC: 104 MMOL/L (ref 98–107)
CO2 SERPL-SCNC: 24.3 MMOL/L (ref 22–29)
CREAT SERPL-MCNC: 1.16 MG/DL (ref 0.76–1.27)
EGFRCR SERPLBLD CKD-EPI 2021: 74.8 ML/MIN/1.73
GLUCOSE SERPL-MCNC: 92 MG/DL (ref 65–99)
POTASSIUM SERPL-SCNC: 3.8 MMOL/L (ref 3.5–5.2)
SODIUM SERPL-SCNC: 141 MMOL/L (ref 136–145)

## 2023-03-17 DIAGNOSIS — I10 PRIMARY HYPERTENSION: Chronic | ICD-10-CM

## 2023-03-17 DIAGNOSIS — E11.9 TYPE 2 DIABETES MELLITUS WITHOUT COMPLICATION, WITHOUT LONG-TERM CURRENT USE OF INSULIN: Chronic | ICD-10-CM

## 2023-03-17 RX ORDER — OLMESARTAN MEDOXOMIL 40 MG/1
40 TABLET ORAL DAILY
Qty: 30 TABLET | Refills: 0 | Status: SHIPPED | OUTPATIENT
Start: 2023-03-17

## 2023-04-17 DIAGNOSIS — I10 PRIMARY HYPERTENSION: Chronic | ICD-10-CM

## 2023-04-17 DIAGNOSIS — E11.9 TYPE 2 DIABETES MELLITUS WITHOUT COMPLICATION, WITHOUT LONG-TERM CURRENT USE OF INSULIN: Chronic | ICD-10-CM

## 2023-04-17 RX ORDER — OLMESARTAN MEDOXOMIL 40 MG/1
40 TABLET ORAL DAILY
Qty: 30 TABLET | Refills: 5 | Status: SHIPPED | OUTPATIENT
Start: 2023-04-17

## 2023-05-18 ENCOUNTER — OFFICE VISIT (OUTPATIENT)
Dept: INTERNAL MEDICINE | Age: 53
End: 2023-05-18
Payer: COMMERCIAL

## 2023-05-18 VITALS
HEART RATE: 87 BPM | OXYGEN SATURATION: 98 % | TEMPERATURE: 97.5 F | DIASTOLIC BLOOD PRESSURE: 68 MMHG | BODY MASS INDEX: 31.34 KG/M2 | SYSTOLIC BLOOD PRESSURE: 110 MMHG | WEIGHT: 195 LBS | HEIGHT: 66 IN

## 2023-05-18 DIAGNOSIS — E66.09 CLASS 1 OBESITY DUE TO EXCESS CALORIES WITH SERIOUS COMORBIDITY AND BODY MASS INDEX (BMI) OF 31.0 TO 31.9 IN ADULT: Chronic | ICD-10-CM

## 2023-05-18 DIAGNOSIS — I10 PRIMARY HYPERTENSION: Chronic | ICD-10-CM

## 2023-05-18 DIAGNOSIS — E78.2 MIXED HYPERLIPIDEMIA: Chronic | ICD-10-CM

## 2023-05-18 DIAGNOSIS — E11.9 TYPE 2 DIABETES MELLITUS WITHOUT COMPLICATION, WITHOUT LONG-TERM CURRENT USE OF INSULIN: Primary | Chronic | ICD-10-CM

## 2023-05-18 PROBLEM — R19.06 EPIGASTRIC SWELLING OR MASS OR LUMP: Status: RESOLVED | Noted: 2021-04-27 | Resolved: 2023-05-18

## 2023-05-18 PROBLEM — R19.7 DIARRHEA: Status: RESOLVED | Noted: 2022-07-29 | Resolved: 2023-05-18

## 2023-05-18 NOTE — ASSESSMENT & PLAN NOTE
Excellent weight loss on current medication regimen with Ozempic and Farxiga. Observe for any unusual/rapid weight loss.     Wt Readings from Last 3 Encounters:   05/18/23 88.5 kg (195 lb)   02/10/23 92.1 kg (203 lb)   08/02/22 88.9 kg (196 lb)     Body mass index is 31.49 kg/m².

## 2023-05-18 NOTE — ASSESSMENT & PLAN NOTE
BP Readings from Last 3 Encounters:   05/18/23 110/68   02/10/23 124/86   08/02/22 110/78        Blood pressure is much improved. Continue olmesartan 40 mg and amlodipine 5 mg qd.

## 2023-05-18 NOTE — ASSESSMENT & PLAN NOTE
Lab Results   Component Value Date    HGBA1C 5.70 (H) 02/10/2023    HGBA1C 5.8 (H) 07/26/2022    HGBA1C 6.1 (H) 02/14/2022        Continue Ozempic 1 mg weekly, Farxiga 10 mg and metformin  mg 2 BID.     Check A1c today.

## 2023-05-18 NOTE — PROGRESS NOTES
I N T E R N A L  M E D I C I N E    J U N O H  K I M,  M D      ENCOUNTER DATE:  05/18/2023    Mega Santiago / 52 y.o. / male    CHIEF COMPLAINT / REASON FOR OFFICE VISIT     Diabetes and Hypertension      ASSESSMENT & PLAN     Problem List Items Addressed This Visit        High    Hypertension (Chronic)    Overview     Continue olmesartan 40 mg and amlodipine 5 mg qd.          Current Assessment & Plan     BP Readings from Last 3 Encounters:   05/18/23 110/68   02/10/23 124/86   08/02/22 110/78        Blood pressure is much improved. Continue olmesartan 40 mg and amlodipine 5 mg qd.          Relevant Medications    amLODIPine (NORVASC) 5 MG tablet    olmesartan (BENICAR) 40 MG tablet    Type 2 diabetes mellitus without complication - Primary (Chronic)    Overview     Continue Ozempic 1 mg weekly, Farxiga 10 mg and metformin  mg 2 BID.          Current Assessment & Plan     Lab Results   Component Value Date    HGBA1C 5.70 (H) 02/10/2023    HGBA1C 5.8 (H) 07/26/2022    HGBA1C 6.1 (H) 02/14/2022        Continue Ozempic 1 mg weekly, Farxiga 10 mg and metformin  mg 2 BID.     Check A1c today.          Relevant Medications    Blood Glucose Monitoring Suppl (ONE TOUCH ULTRA 2) w/Device kit    glucose blood (OneTouch Ultra) test strip    Lancets (onetouch ultrasoft) lancets    Semaglutide, 1 MG/DOSE, (Ozempic, 1 MG/DOSE,) 2 MG/1.5ML solution pen-injector    metFORMIN ER (GLUCOPHAGE-XR) 500 MG 24 hr tablet    dapagliflozin Propanediol (Farxiga) 10 MG tablet    olmesartan (BENICAR) 40 MG tablet    Other Relevant Orders    Comprehensive Metabolic Panel    Hemoglobin A1c    Microalbumin / Creatinine Urine Ratio - Urine, Clean Catch       Medium    Hyperlipidemia (Chronic)    Overview     Continue atorvastatin 40 mg qd.          Relevant Medications    atorvastatin (LIPITOR) 40 MG tablet    Other Relevant Orders    Comprehensive Metabolic Panel    Lipid Panel With / Chol / HDL Ratio       Low    Class 1  "obesity due to excess calories with serious comorbidity and body mass index (BMI) of 31.0 to 31.9 in adult (Chronic)    Current Assessment & Plan     Excellent weight loss on current medication regimen with Ozempic and Farxiga. Observe for any unusual/rapid weight loss.     Wt Readings from Last 3 Encounters:   05/18/23 88.5 kg (195 lb)   02/10/23 92.1 kg (203 lb)   08/02/22 88.9 kg (196 lb)     Body mass index is 31.49 kg/m².             Orders Placed This Encounter   Procedures   • COVID-19 Bivalent (Pfizer) 12+yrs   • Comprehensive Metabolic Panel   • Lipid Panel With / Chol / HDL Ratio   • Hemoglobin A1c   • Microalbumin / Creatinine Urine Ratio - Urine, Clean Catch     No orders of the defined types were placed in this encounter.      SUMMARY/DISCUSSION  •       Next Appointment with me: 7/10/2023    Return in about 6 months (around 11/18/2023) for Diabetes, Hypertension, Hyperlipidemia.        VITAL SIGNS     Vitals:    05/18/23 0735   BP: 110/68   Pulse: 87   Temp: 97.5 °F (36.4 °C)   SpO2: 98%   Weight: 88.5 kg (195 lb)   Height: 167.6 cm (65.98\")       BP Readings from Last 3 Encounters:   05/18/23 110/68   02/10/23 124/86   08/02/22 110/78     Wt Readings from Last 3 Encounters:   05/18/23 88.5 kg (195 lb)   02/10/23 92.1 kg (203 lb)   08/02/22 88.9 kg (196 lb)     Body mass index is 31.49 kg/m².    Blood pressure readings recorded on patient flowsheet:       View : No data to display.                MEDICATIONS AT THE TIME OF OFFICE VISIT     Current Outpatient Medications on File Prior to Visit   Medication Sig   • amLODIPine (NORVASC) 5 MG tablet Take 1 tablet by mouth Every Night.   • atorvastatin (LIPITOR) 40 MG tablet Take 1 tablet by mouth Daily.   • Blood Glucose Monitoring Suppl (ONE TOUCH ULTRA 2) w/Device kit Check sugar daily.   • cetirizine (zyrTEC) 10 MG tablet Take 1 tablet by mouth Every Night.   • Cholecalciferol (VITAMIN D) 2000 UNITS capsule Take 1 capsule by mouth.   • dapagliflozin " Propanediol (Farxiga) 10 MG tablet Take 10 mg by mouth Every Morning.   • glucose blood (OneTouch Ultra) test strip Use as instructed   • Lancets (onetouch ultrasoft) lancets Use as instructed   • metFORMIN ER (GLUCOPHAGE-XR) 500 MG 24 hr tablet Take 2 tablets by mouth 2 (Two) Times a Day.   • olmesartan (BENICAR) 40 MG tablet TAKE 1 TABLET BY MOUTH DAILY   • omeprazole (priLOSEC) 20 MG capsule Take 1 capsule by mouth 2 (Two) Times a Day.   • Semaglutide, 1 MG/DOSE, (Ozempic, 1 MG/DOSE,) 2 MG/1.5ML solution pen-injector Inject 1 mg under the skin into the appropriate area as directed 1 (One) Time Per Week.     No current facility-administered medications on file prior to visit.         HISTORY OF PRESENT ILLNESS     Feeling good and doing well. Weight continues to come down gradually on Ozempic 1 mg and Farxiga 10 mg. Diabetes is very well controlled. No symptoms of hypoglycemia. Blood pressure is better with switch to olmesartan. Cholesterol remains controlled on rosuvastatin.     Lab Results   Component Value Date    HGBA1C 5.70 (H) 02/10/2023    HGBA1C 5.8 (H) 07/26/2022    HGBA1C 6.1 (H) 02/14/2022    CREATININE 1.16 03/13/2023    LDL 78 07/26/2022    MALBCRERATIO 5 09/27/2021     Blood sugar readings recorded on patient's flowsheet:       View : No data to display.               88.5 kg (195 lb)    REVIEW OF SYSTEMS     Constitutional neg except per HPI   Resp neg  CV neg   GI negative    active surveillance of prostate with prior negative biopsy       PHYSICAL EXAMINATION     Physical Exam  Feet:      Comments: Diabetic Foot Exam Performed and Monofilament Test Performed     Monofilament test is normal.       Weight loss noted  Looks well  Cardiovascular: Normal rate, regular rhythm.  Pulm/Chest: Effort normal, breath sounds normal.       REVIEWED DATA     Labs:     Lab Results   Component Value Date     03/13/2023    K 3.8 03/13/2023    CALCIUM 9.3 03/13/2023    AST 19 02/10/2023    ALT 21 02/10/2023     BUN 16 03/13/2023    CREATININE 1.16 03/13/2023    CREATININE 1.37 (H) 02/10/2023    CREATININE 1.34 (H) 11/11/2022    EGFRIFNONA 71 09/27/2021    EGFRIFAFRI 86 09/27/2021       Lab Results   Component Value Date    HGBA1C 5.70 (H) 02/10/2023    HGBA1C 5.8 (H) 07/26/2022    HGBA1C 6.1 (H) 02/14/2022       Lab Results   Component Value Date    LDL 78 07/26/2022    LDL 58 09/27/2021    LDL 53 12/31/2020    HDL 47 07/26/2022    HDL 45 09/27/2021    TRIG 62 07/26/2022    TRIG 70 09/27/2021       Lab Results   Component Value Date    TSH 1.250 07/26/2022    TSH 2.070 02/14/2022    TSH 1.900 09/12/2019    FREET4 1.48 07/26/2022    FREET4 1.43 02/14/2022    FREET4 1.49 09/12/2019       Lab Results   Component Value Date    WBC 8.74 11/11/2022    HGB 14.7 11/11/2022     11/11/2022       Lab Results   Component Value Date    MALBCRERATIO 5 09/27/2021        Imaging:           Medical Tests:           Summary of old records / correspondence / consultant report:           Request outside records:           *Examiner was wearing KN95 mask during the entire duration of the visit. Patient was masked the entire time. Minimum social distance of 6 ft maintained entire visit except if physical contact was necessary as documented.       Template created by Derrell Gray MD

## 2023-05-19 LAB
ALBUMIN SERPL-MCNC: 4.8 G/DL (ref 3.5–5.2)
ALBUMIN/CREAT UR: 7 MG/G CREAT (ref 0–29)
ALBUMIN/GLOB SERPL: 1.9 G/DL
ALP SERPL-CCNC: 87 U/L (ref 39–117)
ALT SERPL-CCNC: 18 U/L (ref 1–41)
AST SERPL-CCNC: 14 U/L (ref 1–40)
BILIRUB SERPL-MCNC: 0.4 MG/DL (ref 0–1.2)
BUN SERPL-MCNC: 20 MG/DL (ref 6–20)
BUN/CREAT SERPL: 19.2 (ref 7–25)
CALCIUM SERPL-MCNC: 9.7 MG/DL (ref 8.6–10.5)
CHLORIDE SERPL-SCNC: 104 MMOL/L (ref 98–107)
CHOLEST SERPL-MCNC: 119 MG/DL (ref 0–200)
CHOLEST/HDLC SERPL: 2.25 {RATIO}
CO2 SERPL-SCNC: 26.3 MMOL/L (ref 22–29)
CREAT SERPL-MCNC: 1.04 MG/DL (ref 0.76–1.27)
CREAT UR-MCNC: 57.5 MG/DL
EGFRCR SERPLBLD CKD-EPI 2021: 85.3 ML/MIN/1.73
GLOBULIN SER CALC-MCNC: 2.5 GM/DL
GLUCOSE SERPL-MCNC: 96 MG/DL (ref 65–99)
HBA1C MFR BLD: 5.3 % (ref 4.8–5.6)
HDLC SERPL-MCNC: 53 MG/DL (ref 40–60)
LDLC SERPL CALC-MCNC: 52 MG/DL (ref 0–100)
MICROALBUMIN UR-MCNC: 4.3 UG/ML
POTASSIUM SERPL-SCNC: 4.1 MMOL/L (ref 3.5–5.2)
PROT SERPL-MCNC: 7.3 G/DL (ref 6–8.5)
SODIUM SERPL-SCNC: 140 MMOL/L (ref 136–145)
TRIGL SERPL-MCNC: 65 MG/DL (ref 0–150)
VLDLC SERPL CALC-MCNC: 14 MG/DL (ref 5–40)

## 2023-06-15 DIAGNOSIS — I10 PRIMARY HYPERTENSION: Chronic | ICD-10-CM

## 2023-06-15 RX ORDER — AMLODIPINE BESYLATE 5 MG/1
5 TABLET ORAL NIGHTLY
Qty: 30 TABLET | Refills: 0 | Status: SHIPPED | OUTPATIENT
Start: 2023-06-15

## 2023-08-02 DIAGNOSIS — E11.9 TYPE 2 DIABETES MELLITUS WITHOUT COMPLICATION, WITHOUT LONG-TERM CURRENT USE OF INSULIN: Primary | ICD-10-CM

## 2023-08-02 RX ORDER — SEMAGLUTIDE 1.34 MG/ML
INJECTION, SOLUTION SUBCUTANEOUS
Qty: 3 ML | Refills: 3 | Status: SHIPPED | OUTPATIENT
Start: 2023-08-02

## 2023-10-03 ENCOUNTER — TELEPHONE (OUTPATIENT)
Dept: INTERNAL MEDICINE | Age: 53
End: 2023-10-03
Payer: COMMERCIAL

## 2023-10-03 NOTE — TELEPHONE ENCOUNTER
Pt was read covering provider dictation. Pt verbalized he will check with travel agency to see if this is mandated. Pt will call back if lab order needs to be placed.

## 2023-10-03 NOTE — TELEPHONE ENCOUNTER
"  Caller: Mega Santiago \"LIZABETH\"    Relationship: Self    Best call back number: [phone]     What was the call regarding: PATIENT STATES HE IS NEEDING A CERTIFICATE STATING HE DOES NOT HAVE TUBERCULOSIS TO GO OVERSEAS. PATIENT IS NEEDING THIS WITHIN 48 HOURS. PATIENT WOULD LIKE TO KNOW IF DR LAGUNA IS ABLE TO DO THIS FOR HIM.    PLEASE CALL AND ADVISE   "

## 2023-10-21 DIAGNOSIS — I10 PRIMARY HYPERTENSION: Chronic | ICD-10-CM

## 2023-10-21 DIAGNOSIS — E11.9 TYPE 2 DIABETES MELLITUS WITHOUT COMPLICATION, WITHOUT LONG-TERM CURRENT USE OF INSULIN: Chronic | ICD-10-CM

## 2023-10-23 ENCOUNTER — FLU SHOT (OUTPATIENT)
Dept: INTERNAL MEDICINE | Age: 53
End: 2023-10-23
Payer: COMMERCIAL

## 2023-10-23 DIAGNOSIS — Z23 NEED FOR VACCINATION: Primary | ICD-10-CM

## 2023-10-23 PROCEDURE — 90471 IMMUNIZATION ADMIN: CPT | Performed by: INTERNAL MEDICINE

## 2023-10-23 PROCEDURE — 90686 IIV4 VACC NO PRSV 0.5 ML IM: CPT | Performed by: INTERNAL MEDICINE

## 2023-10-23 RX ORDER — OLMESARTAN MEDOXOMIL 40 MG/1
40 TABLET ORAL DAILY
Qty: 30 TABLET | Refills: 5 | Status: SHIPPED | OUTPATIENT
Start: 2023-10-23

## 2023-11-20 ENCOUNTER — OFFICE VISIT (OUTPATIENT)
Dept: INTERNAL MEDICINE | Age: 53
End: 2023-11-20
Payer: COMMERCIAL

## 2023-11-20 VITALS
HEART RATE: 70 BPM | HEIGHT: 66 IN | OXYGEN SATURATION: 99 % | SYSTOLIC BLOOD PRESSURE: 128 MMHG | DIASTOLIC BLOOD PRESSURE: 90 MMHG | BODY MASS INDEX: 29.57 KG/M2 | TEMPERATURE: 97.3 F | WEIGHT: 184 LBS

## 2023-11-20 DIAGNOSIS — M77.12 LATERAL EPICONDYLITIS OF LEFT ELBOW: Primary | ICD-10-CM

## 2023-11-20 DIAGNOSIS — E78.2 MIXED HYPERLIPIDEMIA: Chronic | ICD-10-CM

## 2023-11-20 DIAGNOSIS — E11.9 TYPE 2 DIABETES MELLITUS WITHOUT COMPLICATION, WITHOUT LONG-TERM CURRENT USE OF INSULIN: Chronic | ICD-10-CM

## 2023-11-20 DIAGNOSIS — I10 PRIMARY HYPERTENSION: Chronic | ICD-10-CM

## 2023-11-20 LAB
ALBUMIN SERPL-MCNC: 4.6 G/DL (ref 3.5–5.2)
ALBUMIN/GLOB SERPL: 1.8 G/DL
ALP SERPL-CCNC: 81 U/L (ref 39–117)
ALT SERPL-CCNC: 36 U/L (ref 1–41)
AST SERPL-CCNC: 26 U/L (ref 1–40)
BILIRUB SERPL-MCNC: 0.7 MG/DL (ref 0–1.2)
BUN SERPL-MCNC: 19 MG/DL (ref 6–20)
BUN/CREAT SERPL: 19.6 (ref 7–25)
CALCIUM SERPL-MCNC: 9.4 MG/DL (ref 8.6–10.5)
CHLORIDE SERPL-SCNC: 103 MMOL/L (ref 98–107)
CO2 SERPL-SCNC: 29.3 MMOL/L (ref 22–29)
CREAT SERPL-MCNC: 0.97 MG/DL (ref 0.76–1.27)
EGFRCR SERPLBLD CKD-EPI 2021: 92.8 ML/MIN/1.73
GLOBULIN SER CALC-MCNC: 2.6 GM/DL
GLUCOSE SERPL-MCNC: 86 MG/DL (ref 65–99)
HBA1C MFR BLD: 5.6 % (ref 4.8–5.6)
POTASSIUM SERPL-SCNC: 3.9 MMOL/L (ref 3.5–5.2)
PROT SERPL-MCNC: 7.2 G/DL (ref 6–8.5)
SODIUM SERPL-SCNC: 140 MMOL/L (ref 136–145)

## 2023-11-20 RX ORDER — METFORMIN HYDROCHLORIDE 500 MG/1
500 TABLET, EXTENDED RELEASE ORAL 2 TIMES DAILY
Status: SHIPPED | COMMUNITY
Start: 2023-11-20

## 2023-11-20 NOTE — ASSESSMENT & PLAN NOTE
Lab Results   Component Value Date    HGBA1C 5.30 05/18/2023    HGBA1C 5.70 (H) 02/10/2023    HGBA1C 5.8 (H) 07/26/2022    CREATININE 1.04 05/18/2023    LDL 52 05/18/2023    MALBCRERATIO 7 05/18/2023      Taking metformin  mg 1 BID (not 2 BID).   Continue Ozempic 1 mg weekly and Farxiga 10 mg qAM.

## 2023-11-20 NOTE — ASSESSMENT & PLAN NOTE
Started in Spring with ongoing significant pain and weakness. Suspect he may have a partial tear. Avoid aggravating physical activity! Recommended formal PT. If not improving in 1 month will need MRI. Discussed PRP/PPP injection.  Take Aleve OTC PRN.

## 2023-11-20 NOTE — PROGRESS NOTES
I N T E R N A L  M E D I C I N E    J U N O H  K I M,  M D      ENCOUNTER DATE:  11/20/2023    Mega Santiago / 52 y.o. / male    CHIEF COMPLAINT / REASON FOR OFFICE VISIT     Diabetes and Hypertension      ASSESSMENT & PLAN     Problem List Items Addressed This Visit          High    Hypertension (Chronic)    Overview     Continue olmesartan 40 mg and amlodipine 5 mg qd.          Relevant Medications    amLODIPine (NORVASC) 5 MG tablet    olmesartan (BENICAR) 40 MG tablet    Other Relevant Orders    Comprehensive Metabolic Panel    Type 2 diabetes mellitus without complication (Chronic)    Overview     Continue Ozempic 1 mg weekly, Farxiga 10 mg and metformin  mg BID.          Current Assessment & Plan     Lab Results   Component Value Date    HGBA1C 5.30 05/18/2023    HGBA1C 5.70 (H) 02/10/2023    HGBA1C 5.8 (H) 07/26/2022    CREATININE 1.04 05/18/2023    LDL 52 05/18/2023    MALBCRERATIO 7 05/18/2023      Taking metformin  mg 1 BID (not 2 BID).   Continue Ozempic 1 mg weekly and Farxiga 10 mg qAM.          Relevant Medications    Blood Glucose Monitoring Suppl (ONE TOUCH ULTRA 2) w/Device kit    glucose blood (OneTouch Ultra) test strip    Lancets (onetouch ultrasoft) lancets    Farxiga 10 MG tablet    Ozempic, 1 MG/DOSE, 4 MG/3ML solution pen-injector    olmesartan (BENICAR) 40 MG tablet    metFORMIN ER (GLUCOPHAGE-XR) 500 MG 24 hr tablet    Other Relevant Orders    Hemoglobin A1c    Comprehensive Metabolic Panel    Lateral epicondylitis of left elbow - Primary    Current Assessment & Plan     Started in Spring with ongoing significant pain and weakness. Suspect he may have a partial tear. Avoid aggravating physical activity! Recommended formal PT. If not improving in 1 month will need MRI. Discussed PRP/PPP injection.  Take Aleve OTC PRN.             Medium    Hyperlipidemia (Chronic)    Overview     Continue atorvastatin 40 mg qd.          Relevant Medications    atorvastatin (LIPITOR) 40  "MG tablet    Other Relevant Orders    Comprehensive Metabolic Panel     Orders Placed This Encounter   Procedures    Hemoglobin A1c    Comprehensive Metabolic Panel     No orders of the defined types were placed in this encounter.      SUMMARY/DISCUSSION        Next Appointment with me: Visit date not found    Return in about 4 months (around 3/20/2024) for Reassess chronic medical problems.        VITAL SIGNS     Vitals:    11/20/23 0735   BP: 128/90   Pulse: 70   Temp: 97.3 °F (36.3 °C)   SpO2: 99%   Weight: 83.5 kg (184 lb)   Height: 167.6 cm (65.98\")       BP Readings from Last 3 Encounters:   11/20/23 128/90   07/10/23 128/82   05/18/23 110/68     Wt Readings from Last 3 Encounters:   11/20/23 83.5 kg (184 lb)   07/10/23 88 kg (194 lb)   05/18/23 88.5 kg (195 lb)     Body mass index is 29.72 kg/m².    Blood pressure readings recorded on patient flowsheet:       No data to display                MEDICATIONS AT THE TIME OF OFFICE VISIT     Current Outpatient Medications on File Prior to Visit   Medication Sig    amLODIPine (NORVASC) 5 MG tablet TAKE 1 TABLET BY MOUTH EVERY NIGHT    atorvastatin (LIPITOR) 40 MG tablet Take 1 tablet by mouth Daily.    Blood Glucose Monitoring Suppl (ONE TOUCH ULTRA 2) w/Device kit Check sugar daily.    cetirizine (zyrTEC) 10 MG tablet Take 1 tablet by mouth Every Night.    Cholecalciferol (VITAMIN D) 2000 UNITS capsule Take 1 capsule by mouth.    COVID-19 mRNA Vac-Lotus,Pfizer, 30 MCG/0.3ML suspension Inject  into the appropriate muscle as directed by prescriber.    Farxiga 10 MG tablet TAKE 1 TABLET BY MOUTH EVERY MORNING    glucose blood (OneTouch Ultra) test strip Use as instructed    Lancets (onetouch ultrasoft) lancets Use as instructed    metFORMIN ER (GLUCOPHAGE-XR) 500 MG 24 hr tablet Take 1 tablet by mouth 2 (Two) Times a Day.    olmesartan (BENICAR) 40 MG tablet TAKE 1 TABLET BY MOUTH DAILY    Ozempic, 1 MG/DOSE, 4 MG/3ML solution pen-injector INJECT 1 MG UNDER THE SKIN " ONE DAY A WEEK    [DISCONTINUED] metFORMIN ER (GLUCOPHAGE-XR) 500 MG 24 hr tablet Take 2 tablets by mouth 2 (Two) Times a Day. (Patient taking differently: Take 2 tablets by mouth 2 (Two) Times a Day. Take 500 mg bid)    [DISCONTINUED] omeprazole (priLOSEC) 20 MG capsule Take 1 capsule by mouth 2 (Two) Times a Day. (Patient not taking: Reported on 7/10/2023)     No current facility-administered medications on file prior to visit.         HISTORY OF PRESENT ILLNESS     He reports left arm pain that began earlier this year, recently worsening in the last 2 months. Also has weakness and loss of  strength on the left side that improves when wearing a brace. 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 This may be further exacerbated by working on his computer for 12 to 15 hours per day. Denies playing sports. He takes Tylenol as needed for pain.    His blood pressure is 128/90 mmHg today. On olmesartan 40 mg and amlodipine 5 mg daily for hypertension. He monitors his blood pressure at home and notes a diastolic of 80 to 81 mmHg. He has lost 10 pounds since his last visit, which he contributes to stress and a busy lifestyle since taking on a new position at work. His most recent A1c in 05/2023 was 5.3 percent. Diabetes is managed on Ozempic 1 mg weekly, Farxiga 10 mg and metformin  mg 1 tablet twice daily. His cholesterol in 05/2023 revealed improvement with his LDL at 52 mg/dL and HDL of 53 mg/dL. Takes atorvastatin 40 mg daily for hyperlipidemia. He no longer takes his heartburn medication. Denies any reflux or heartburn symptoms.     He had a biopsy of his prostate in 01/2023 that was negative for malignancy. He will follow up in 2 years.    Lab Results   Component Value Date    HGBA1C 5.30 05/18/2023    HGBA1C 5.70 (H) 02/10/2023    HGBA1C 5.8 (H) 07/26/2022    CREATININE 1.04 05/18/2023    LDL  52 05/18/2023    MALBCRERATIO 7 05/18/2023     Blood sugar readings recorded on patient's flowsheet:       No data to display               83.5 kg (184 lb)    REVIEW OF SYSTEMS           PHYSICAL EXAMINATION     Physical Exam  No acute distress   Weight loss noted   Cardiovascular: Normal rate, regular rhythm.  Pulm/Chest: Effort normal, breath sounds normal.  Left lateral epicondyle area with significant tenderness to palpation; worse with opposed supination       REVIEWED DATA     Labs:       Lab Results   Component Value Date     05/18/2023    K 4.1 05/18/2023    CALCIUM 9.7 05/18/2023    AST 14 05/18/2023    ALT 18 05/18/2023    BUN 20 05/18/2023    CREATININE 1.04 05/18/2023    CREATININE 1.16 03/13/2023    CREATININE 1.37 (H) 02/10/2023    EGFRIFNONA 71 09/27/2021    EGFRIFAFRI 86 09/27/2021       Lab Results   Component Value Date    HGBA1C 5.30 05/18/2023    HGBA1C 5.70 (H) 02/10/2023    HGBA1C 5.8 (H) 07/26/2022       Lab Results   Component Value Date    LDL 52 05/18/2023    LDL 78 07/26/2022    LDL 58 09/27/2021    HDL 53 05/18/2023    HDL 47 07/26/2022    TRIG 65 05/18/2023    TRIG 62 07/26/2022       Lab Results   Component Value Date    TSH 1.250 07/26/2022    TSH 2.070 02/14/2022    TSH 1.900 09/12/2019    FREET4 1.48 07/26/2022    FREET4 1.43 02/14/2022    FREET4 1.49 09/12/2019       Lab Results   Component Value Date    WBC 8.74 11/11/2022    HGB 14.7 11/11/2022     11/11/2022       Lab Results   Component Value Date    MALBCRERATIO 7 05/18/2023            Imaging:           Medical Tests:           Summary of old records / correspondence / consultant report:           Request outside records:     Transcribed from ambient dictation for Alber Gray MD by Sybil Marcelino.  11/20/23   08:54 EST    Patient or patient representative verbalized consent to the visit recording.  I have personally performed the services described in this document as transcribed by the above individual, and it is  both accurate and complete.  Alber Gray MD  11/20/2023  10:08 EST

## 2023-12-24 DIAGNOSIS — E11.9 TYPE 2 DIABETES MELLITUS WITHOUT COMPLICATION, WITHOUT LONG-TERM CURRENT USE OF INSULIN: ICD-10-CM

## 2023-12-26 RX ORDER — SEMAGLUTIDE 1.34 MG/ML
INJECTION, SOLUTION SUBCUTANEOUS
Qty: 3 ML | Refills: 5 | Status: SHIPPED | OUTPATIENT
Start: 2023-12-26

## 2023-12-28 ENCOUNTER — TELEPHONE (OUTPATIENT)
Dept: INTERNAL MEDICINE | Age: 53
End: 2023-12-28

## 2023-12-28 NOTE — TELEPHONE ENCOUNTER
"Caller: Mega Santiago \"LIZABETH\"    Relationship: Self    Best call back number: [phone]     What form or medical record are you requesting: PRIOR AUTHORIZATION    Who is requesting this form or medical record from you: HEALTH INSURANCE      Timeframe paperwork needed: ASAP    Additional notes:       [facility] IS REQUIRING A PRIOR AUTHORIZATION FOR THE MEDICATION OZEMPIC 1 MG.    THEY GAVE THE PATIENT A NUMBER TO GIVE TO THE OFFICE TO CONTACT THEM THE NUMBER IS [id number]/[phone] [employer] FOR PRE AUTHORIZATION DEPARTMENT.        "

## 2024-01-13 DIAGNOSIS — E11.9 TYPE 2 DIABETES MELLITUS WITHOUT COMPLICATION, WITHOUT LONG-TERM CURRENT USE OF INSULIN: Chronic | ICD-10-CM

## 2024-01-15 RX ORDER — DAPAGLIFLOZIN 10 MG/1
TABLET, FILM COATED ORAL
Qty: 30 TABLET | Refills: 5 | Status: SHIPPED | OUTPATIENT
Start: 2024-01-15

## 2024-02-01 DIAGNOSIS — E78.2 MIXED HYPERLIPIDEMIA: Chronic | ICD-10-CM

## 2024-02-01 RX ORDER — ATORVASTATIN CALCIUM 40 MG/1
40 TABLET, FILM COATED ORAL DAILY
Qty: 90 TABLET | Refills: 1 | Status: SHIPPED | OUTPATIENT
Start: 2024-02-01

## 2024-03-22 ENCOUNTER — OFFICE VISIT (OUTPATIENT)
Dept: INTERNAL MEDICINE | Age: 54
End: 2024-03-22
Payer: COMMERCIAL

## 2024-03-22 VITALS
DIASTOLIC BLOOD PRESSURE: 82 MMHG | HEART RATE: 78 BPM | BODY MASS INDEX: 29.25 KG/M2 | OXYGEN SATURATION: 98 % | HEIGHT: 66 IN | WEIGHT: 182 LBS | SYSTOLIC BLOOD PRESSURE: 130 MMHG | TEMPERATURE: 97.3 F

## 2024-03-22 DIAGNOSIS — E11.9 TYPE 2 DIABETES MELLITUS WITHOUT COMPLICATION, WITHOUT LONG-TERM CURRENT USE OF INSULIN: Chronic | ICD-10-CM

## 2024-03-22 DIAGNOSIS — G62.9 PERIPHERAL POLYNEUROPATHY: ICD-10-CM

## 2024-03-22 DIAGNOSIS — I10 PRIMARY HYPERTENSION: Primary | Chronic | ICD-10-CM

## 2024-03-22 DIAGNOSIS — E78.2 MIXED HYPERLIPIDEMIA: Chronic | ICD-10-CM

## 2024-03-22 DIAGNOSIS — M77.8 RIGHT SHOULDER TENDONITIS: ICD-10-CM

## 2024-03-22 NOTE — ASSESSMENT & PLAN NOTE
Lab Results   Component Value Date    HGBA1C 5.60 11/20/2023    HGBA1C 5.30 05/18/2023    HGBA1C 5.70 (H) 02/10/2023    CREATININE 0.97 11/20/2023    LDL 52 05/18/2023    MALBCRERATIO 7 05/18/2023      Continue same. Check lab.

## 2024-03-22 NOTE — PROGRESS NOTES
I N T E R N A L  M E D I C I N E    J U N O H  K I M,  M D      ENCOUNTER DATE:  03/22/2024    Mega Santiago / 53 y.o. / male    CHIEF COMPLAINT / REASON FOR OFFICE VISIT     Hypertension, Diabetes, and Hyperlipidemia      ASSESSMENT & PLAN     Problem List Items Addressed This Visit          High    Type 2 diabetes mellitus without complication (Chronic)    Overview     Continue   Ozempic 1 mg weekly  Farxiga 10 mg   metformin  mg BID.          Current Assessment & Plan     Lab Results   Component Value Date    HGBA1C 5.60 11/20/2023    HGBA1C 5.30 05/18/2023    HGBA1C 5.70 (H) 02/10/2023    CREATININE 0.97 11/20/2023    LDL 52 05/18/2023    MALBCRERATIO 7 05/18/2023      Continue same. Check lab.          Relevant Medications    Blood Glucose Monitoring Suppl (ONE TOUCH ULTRA 2) w/Device kit    glucose blood (OneTouch Ultra) test strip    Lancets (onetouch ultrasoft) lancets    olmesartan (BENICAR) 40 MG tablet    metFORMIN ER (GLUCOPHAGE-XR) 500 MG 24 hr tablet    Ozempic, 1 MG/DOSE, 4 MG/3ML solution pen-injector    Farxiga 10 MG tablet    Other Relevant Orders    Comprehensive Metabolic Panel    Hemoglobin A1c    Microalbumin / Creatinine Urine Ratio - Urine, Clean Catch       Medium    Hyperlipidemia (Chronic)    Overview     Continue atorvastatin 40 mg qd.          Relevant Medications    atorvastatin (LIPITOR) 40 MG tablet    Other Relevant Orders    Comprehensive Metabolic Panel    Lipid Panel With / Chol / HDL Ratio    Hypertension - Primary (Chronic)    Overview     Continue olmesartan 40 mg and amlodipine 5 mg qd.          Relevant Medications    amLODIPine (NORVASC) 5 MG tablet    olmesartan (BENICAR) 40 MG tablet    Other Relevant Orders    Comprehensive Metabolic Panel    Peripheral polyneuropathy (Chronic)    Current Assessment & Plan     This is a new problem to this examiner.  Symptoms of mild peripheral neuropathy. Possibly due to diabetes. May have early CTS of hands. Monitor  "closely.             Low    Right shoulder tendonitis    Current Assessment & Plan     Recommended home based PT.           Orders Placed This Encounter   Procedures    Comprehensive Metabolic Panel    Lipid Panel With / Chol / HDL Ratio    Hemoglobin A1c    Microalbumin / Creatinine Urine Ratio - Urine, Clean Catch     No orders of the defined types were placed in this encounter.      SUMMARY/DISCUSSION        Next Appointment with me: Visit date not found    Return in about 6 months (around 9/22/2024) for Reassess chronic medical problems.      VITAL SIGNS     Vitals:    03/22/24 0727   BP: 130/82   Pulse: 78   Temp: 97.3 °F (36.3 °C)   SpO2: 98%   Weight: 82.6 kg (182 lb)   Height: 167.6 cm (65.98\")       BP Readings from Last 3 Encounters:   03/22/24 130/82   11/20/23 128/90   07/10/23 128/82     Wt Readings from Last 3 Encounters:   03/22/24 82.6 kg (182 lb)   11/20/23 83.5 kg (184 lb)   07/10/23 88 kg (194 lb)     Body mass index is 29.39 kg/m².    Blood pressure readings recorded on patient flowsheet:       No data to display                  MEDICATIONS AT THE TIME OF OFFICE VISIT     Current Outpatient Medications on File Prior to Visit   Medication Sig    amLODIPine (NORVASC) 5 MG tablet TAKE 1 TABLET BY MOUTH EVERY NIGHT    atorvastatin (LIPITOR) 40 MG tablet TAKE 1 TABLET BY MOUTH DAILY    Blood Glucose Monitoring Suppl (ONE TOUCH ULTRA 2) w/Device kit Check sugar daily.    cetirizine (zyrTEC) 10 MG tablet Take 1 tablet by mouth Every Night.    Cholecalciferol (VITAMIN D) 2000 UNITS capsule Take 1 capsule by mouth.    Farxiga 10 MG tablet TAKE 1 TABLET BY MOUTH EVERY MORNING    glucose blood (OneTouch Ultra) test strip Use as instructed    Lancets (onetouch ultrasoft) lancets Use as instructed    metFORMIN ER (GLUCOPHAGE-XR) 500 MG 24 hr tablet Take 1 tablet by mouth 2 (Two) Times a Day.    olmesartan (BENICAR) 40 MG tablet TAKE 1 TABLET BY MOUTH DAILY    Ozempic, 1 MG/DOSE, 4 MG/3ML solution " pen-injector INJECT 1 MG UNDER THE SKIN ONCE A WEEK    [DISCONTINUED] COVID-19 mRNA Vac-Lotus,Pfizer, 30 MCG/0.3ML suspension Inject  into the appropriate muscle as directed by prescriber.     No current facility-administered medications on file prior to visit.          HISTORY OF PRESENT ILLNESS     The patient presents for a 4-month follow-up of hypertension, type 2 diabetes mellitus, and hyperlipidemia    Left elbow pain  His left elbow pain has fully resolved.    Left shoulder pain  He has severe pain in his right shoulder, particularly with external rotation, and is avoiding any strenuous activity. The pain is affecting his sleep and he is experiencing numbness at night in his bilateral upper extremities. He denies any numbness during the day.     Type 2 diabetes mellitus  On Ozempic 1 mg, Farxiga 10 mg and metformin ER. He complains of dry skin, cold hands, and cold feet. His last hemoglobin A1c improved to 5.6 percent. He recently had his diabetic eye examination.     Sleep apnea  He has a history of sleep apnea with CPAP usage.     Health maintenance  He has not had a colonoscopy but did complete the Cologuard.     His maternal aunt recently  of stomach cancer. His maternal uncle  with Parkinson's disease. His mother is 86 and has Parkinson's disease and dementia. His 88-year-old maternal aunt has thyroid issues and Alzheimer's type dementia. His maternal grandmother  of breast cancer. There is no family history of colon cancer.       Patient Care Team:  Alber Gray MD as PCP - Cesar Sweet DO as Consulting Physician (Ophthalmology)    REVIEW OF SYSTEMS     Review of Systems       PHYSICAL EXAMINATION     Physical Exam  Alert with normal thought and judgment.   Weight noted   Cardiovascular: Normal rate, regular rhythm.       REVIEWED DATA     Labs:     Lab Results   Component Value Date     2023    K 3.9 2023    CALCIUM 9.4 2023    AST 26 2023     ALT 36 11/20/2023    BUN 19 11/20/2023    CREATININE 0.97 11/20/2023    CREATININE 1.04 05/18/2023    CREATININE 1.16 03/13/2023    EGFRRESULT 92.8 11/20/2023       Lab Results   Component Value Date    HGBA1C 5.60 11/20/2023    HGBA1C 5.30 05/18/2023    HGBA1C 5.70 (H) 02/10/2023       Lab Results   Component Value Date    LDL 52 05/18/2023    LDL 78 07/26/2022    LDL 58 09/27/2021    HDL 53 05/18/2023    HDL 47 07/26/2022    TRIG 65 05/18/2023    TRIG 62 07/26/2022       Lab Results   Component Value Date    TSH 1.250 07/26/2022    TSH 2.070 02/14/2022    TSH 1.900 09/12/2019    FREET4 1.48 07/26/2022    FREET4 1.43 02/14/2022    FREET4 1.49 09/12/2019       Lab Results   Component Value Date    WBC 8.74 11/11/2022    HGB 14.7 11/11/2022     11/11/2022       Lab Results   Component Value Date    MALBCRERATIO 7 05/18/2023           Imaging:           Medical Tests:           Summary of old records / correspondence / consultant report:           Request outside records:         Transcribed from ambient dictation for Alber Gray MD by Katia Aranda.  03/22/24   09:03 EDT    Patient or patient representative verbalized consent to the visit recording.  I have personally performed the services described in this document as transcribed by the above individual, and it is both accurate and complete.  Alber Gray MD  3/22/2024  12:59 EDT

## 2024-03-22 NOTE — ASSESSMENT & PLAN NOTE
This is a new problem to this examiner.  Symptoms of mild peripheral neuropathy. Possibly due to diabetes. May have early CTS of hands. Monitor closely.

## 2024-03-23 LAB
ALBUMIN SERPL-MCNC: 4.9 G/DL (ref 3.5–5.2)
ALBUMIN/CREAT UR: 12 MG/G CREAT (ref 0–29)
ALBUMIN/GLOB SERPL: 1.8 G/DL
ALP SERPL-CCNC: 90 U/L (ref 39–117)
ALT SERPL-CCNC: 22 U/L (ref 1–41)
AST SERPL-CCNC: 23 U/L (ref 1–40)
BILIRUB SERPL-MCNC: 0.7 MG/DL (ref 0–1.2)
BUN SERPL-MCNC: 24 MG/DL (ref 6–20)
BUN/CREAT SERPL: 21.8 (ref 7–25)
CALCIUM SERPL-MCNC: 9.4 MG/DL (ref 8.6–10.5)
CHLORIDE SERPL-SCNC: 104 MMOL/L (ref 98–107)
CHOLEST SERPL-MCNC: 158 MG/DL (ref 0–200)
CHOLEST/HDLC SERPL: 2.47 {RATIO}
CO2 SERPL-SCNC: 25.9 MMOL/L (ref 22–29)
CREAT SERPL-MCNC: 1.1 MG/DL (ref 0.76–1.27)
CREAT UR-MCNC: 102.7 MG/DL
EGFRCR SERPLBLD CKD-EPI 2021: 79.3 ML/MIN/1.73
GLOBULIN SER CALC-MCNC: 2.7 GM/DL
GLUCOSE SERPL-MCNC: 79 MG/DL (ref 65–99)
HBA1C MFR BLD: 5.4 % (ref 4.8–5.6)
HDLC SERPL-MCNC: 64 MG/DL (ref 40–60)
LDLC SERPL CALC-MCNC: 80 MG/DL (ref 0–100)
MICROALBUMIN UR-MCNC: 12.5 UG/ML
POTASSIUM SERPL-SCNC: 4.1 MMOL/L (ref 3.5–5.2)
PROT SERPL-MCNC: 7.6 G/DL (ref 6–8.5)
SODIUM SERPL-SCNC: 139 MMOL/L (ref 136–145)
TRIGL SERPL-MCNC: 71 MG/DL (ref 0–150)
VLDLC SERPL CALC-MCNC: 14 MG/DL (ref 5–40)

## 2024-04-26 DIAGNOSIS — E11.9 TYPE 2 DIABETES MELLITUS WITHOUT COMPLICATION, WITHOUT LONG-TERM CURRENT USE OF INSULIN: Chronic | ICD-10-CM

## 2024-04-26 DIAGNOSIS — I10 PRIMARY HYPERTENSION: Chronic | ICD-10-CM

## 2024-04-26 RX ORDER — OLMESARTAN MEDOXOMIL 40 MG/1
40 TABLET ORAL DAILY
Qty: 90 TABLET | Refills: 1 | Status: SHIPPED | OUTPATIENT
Start: 2024-04-26

## 2024-06-21 DIAGNOSIS — E11.9 TYPE 2 DIABETES MELLITUS WITHOUT COMPLICATION, WITHOUT LONG-TERM CURRENT USE OF INSULIN: Chronic | ICD-10-CM

## 2024-06-21 RX ORDER — METFORMIN HYDROCHLORIDE 500 MG/1
1000 TABLET, EXTENDED RELEASE ORAL 2 TIMES DAILY
Qty: 360 TABLET | Refills: 1 | Status: SHIPPED | OUTPATIENT
Start: 2024-06-21

## 2024-06-24 ENCOUNTER — PATIENT MESSAGE (OUTPATIENT)
Dept: INTERNAL MEDICINE | Age: 54
End: 2024-06-24
Payer: COMMERCIAL

## 2024-06-24 DIAGNOSIS — E11.9 TYPE 2 DIABETES MELLITUS WITHOUT COMPLICATION, WITHOUT LONG-TERM CURRENT USE OF INSULIN: Chronic | ICD-10-CM

## 2024-06-24 RX ORDER — DAPAGLIFLOZIN 10 MG/1
1 TABLET, FILM COATED ORAL EVERY MORNING
Qty: 30 TABLET | Refills: 5 | Status: SHIPPED | OUTPATIENT
Start: 2024-06-24 | End: 2024-06-28

## 2024-06-24 NOTE — TELEPHONE ENCOUNTER
From: Mega Santiago  To: Alber Gray  Sent: 6/24/2024 12:21 PM EDT  Subject: Prescription for Farxiga 10mg    Hi Dr. Gray and team.   There has been an update from my insurance carrier [employer] about my current prescription for Dapaglifloxin 10mg, which is no longer covered under my medical insurance. Per [employer], they have re-instated coverage (and no pre-authorization required) for Farxiga 10mg under my insurance coverage once again. Can you please send in a new prescription to [facility] pharmacy at the corner [facility] and [address] for the Farxiga as I have one week's worth of DAP left before I run out.     Thank you and let me know if you have any questions.  Clarence Santiago

## 2024-06-28 ENCOUNTER — TELEPHONE (OUTPATIENT)
Dept: INTERNAL MEDICINE | Age: 54
End: 2024-06-28
Payer: COMMERCIAL

## 2024-06-28 DIAGNOSIS — E11.9 TYPE 2 DIABETES MELLITUS WITHOUT COMPLICATION, WITHOUT LONG-TERM CURRENT USE OF INSULIN: Primary | Chronic | ICD-10-CM

## 2024-06-28 RX ORDER — DAPAGLIFLOZIN 10 MG/1
10 TABLET, FILM COATED ORAL EVERY MORNING
Qty: 30 TABLET | Refills: 5 | Status: SHIPPED | OUTPATIENT
Start: 2024-06-28

## 2024-06-28 NOTE — TELEPHONE ENCOUNTER
"    Caller: Mega Santiago \"LIZABETH\"    Relationship: Self    Best call back number: [phone]     What medication are you requesting: FARXIGA     If a prescription is needed, what is your preferred pharmacy and phone number: [id number] [place], [place] - [address] AT [facility] [address] - [phone] [facility] [phone] FX     Additional notes:  PATIENT STATES HIS INSURANCE WILL NOW COVER THE BRAND FARXIGA MEDICATION AND NOT THE GENERIC. PATIENT STATES THAT A PRIOR AUTHORIZATION IS NOT NEEDED    PATIENT WOULD LIKE TO KNOW IF THIS MEDICATION COULD BE CALLED IN     PLEASE CALL AND ADVISE         "

## 2024-07-03 DIAGNOSIS — I10 PRIMARY HYPERTENSION: Chronic | ICD-10-CM

## 2024-07-03 RX ORDER — AMLODIPINE BESYLATE 5 MG/1
5 TABLET ORAL NIGHTLY
Qty: 90 TABLET | Refills: 1 | Status: SHIPPED | OUTPATIENT
Start: 2024-07-03

## 2024-07-06 DIAGNOSIS — E11.9 TYPE 2 DIABETES MELLITUS WITHOUT COMPLICATION, WITHOUT LONG-TERM CURRENT USE OF INSULIN: ICD-10-CM

## 2024-07-08 RX ORDER — SEMAGLUTIDE 1.34 MG/ML
INJECTION, SOLUTION SUBCUTANEOUS
Qty: 3 ML | Refills: 5 | Status: SHIPPED | OUTPATIENT
Start: 2024-07-08

## 2024-07-27 DIAGNOSIS — E78.2 MIXED HYPERLIPIDEMIA: Chronic | ICD-10-CM

## 2024-07-30 RX ORDER — ATORVASTATIN CALCIUM 40 MG/1
40 TABLET, FILM COATED ORAL DAILY
Qty: 90 TABLET | Refills: 1 | Status: SHIPPED | OUTPATIENT
Start: 2024-07-30

## 2024-09-27 ENCOUNTER — OFFICE VISIT (OUTPATIENT)
Dept: INTERNAL MEDICINE | Age: 54
End: 2024-09-27
Payer: COMMERCIAL

## 2024-09-27 VITALS
SYSTOLIC BLOOD PRESSURE: 126 MMHG | HEIGHT: 66 IN | BODY MASS INDEX: 29.73 KG/M2 | HEART RATE: 82 BPM | TEMPERATURE: 97.1 F | DIASTOLIC BLOOD PRESSURE: 88 MMHG | OXYGEN SATURATION: 97 % | WEIGHT: 185 LBS

## 2024-09-27 DIAGNOSIS — E11.9 TYPE 2 DIABETES MELLITUS WITHOUT COMPLICATION, WITHOUT LONG-TERM CURRENT USE OF INSULIN: Primary | Chronic | ICD-10-CM

## 2024-09-27 DIAGNOSIS — E78.2 MIXED HYPERLIPIDEMIA: Chronic | ICD-10-CM

## 2024-09-27 DIAGNOSIS — I10 PRIMARY HYPERTENSION: Chronic | ICD-10-CM

## 2024-09-27 DIAGNOSIS — Z00.00 ENCOUNTER FOR ANNUAL HEALTH EXAMINATION: ICD-10-CM

## 2024-09-27 LAB
ALBUMIN SERPL-MCNC: 4.5 G/DL (ref 3.5–5.2)
ALBUMIN/GLOB SERPL: 1.8 G/DL
ALP SERPL-CCNC: 78 U/L (ref 39–117)
ALT SERPL-CCNC: 14 U/L (ref 1–41)
AST SERPL-CCNC: 12 U/L (ref 1–40)
BILIRUB SERPL-MCNC: 0.4 MG/DL (ref 0–1.2)
BUN SERPL-MCNC: 16 MG/DL (ref 6–20)
BUN/CREAT SERPL: 13.8 (ref 7–25)
CALCIUM SERPL-MCNC: 9.2 MG/DL (ref 8.6–10.5)
CHLORIDE SERPL-SCNC: 103 MMOL/L (ref 98–107)
CHOLEST SERPL-MCNC: 191 MG/DL (ref 0–200)
CHOLEST/HDLC SERPL: 3.6 {RATIO}
CO2 SERPL-SCNC: 26.6 MMOL/L (ref 22–29)
CREAT SERPL-MCNC: 1.16 MG/DL (ref 0.76–1.27)
EGFRCR SERPLBLD CKD-EPI 2021: 74.4 ML/MIN/1.73
GLOBULIN SER CALC-MCNC: 2.5 GM/DL
GLUCOSE SERPL-MCNC: 80 MG/DL (ref 65–99)
HBA1C MFR BLD: 5.4 % (ref 4.8–5.6)
HDLC SERPL-MCNC: 53 MG/DL (ref 40–60)
LDLC SERPL CALC-MCNC: 125 MG/DL (ref 0–100)
POTASSIUM SERPL-SCNC: 4.1 MMOL/L (ref 3.5–5.2)
PROT SERPL-MCNC: 7 G/DL (ref 6–8.5)
SODIUM SERPL-SCNC: 141 MMOL/L (ref 136–145)
TRIGL SERPL-MCNC: 72 MG/DL (ref 0–150)
VLDLC SERPL CALC-MCNC: 13 MG/DL (ref 5–40)

## 2024-09-27 PROCEDURE — 90656 IIV3 VACC NO PRSV 0.5 ML IM: CPT | Performed by: INTERNAL MEDICINE

## 2024-09-27 PROCEDURE — 90471 IMMUNIZATION ADMIN: CPT | Performed by: INTERNAL MEDICINE

## 2024-09-27 PROCEDURE — 99214 OFFICE O/P EST MOD 30 MIN: CPT | Performed by: INTERNAL MEDICINE

## 2024-10-06 DIAGNOSIS — E11.9 TYPE 2 DIABETES MELLITUS WITHOUT COMPLICATION, WITHOUT LONG-TERM CURRENT USE OF INSULIN: Chronic | ICD-10-CM

## 2024-10-06 DIAGNOSIS — I10 PRIMARY HYPERTENSION: Chronic | ICD-10-CM

## 2024-10-07 RX ORDER — OLMESARTAN MEDOXOMIL 40 MG/1
40 TABLET ORAL DAILY
Qty: 90 TABLET | Refills: 1 | Status: SHIPPED | OUTPATIENT
Start: 2024-10-07

## 2024-12-07 DIAGNOSIS — E11.9 TYPE 2 DIABETES MELLITUS WITHOUT COMPLICATION, WITHOUT LONG-TERM CURRENT USE OF INSULIN: ICD-10-CM

## 2024-12-11 RX ORDER — SEMAGLUTIDE 1.34 MG/ML
INJECTION, SOLUTION SUBCUTANEOUS
Qty: 3 ML | Refills: 2 | Status: SHIPPED | OUTPATIENT
Start: 2024-12-11

## 2024-12-25 DIAGNOSIS — E11.9 TYPE 2 DIABETES MELLITUS WITHOUT COMPLICATION, WITHOUT LONG-TERM CURRENT USE OF INSULIN: Chronic | ICD-10-CM

## 2024-12-26 RX ORDER — DAPAGLIFLOZIN 10 MG/1
1 TABLET, FILM COATED ORAL EVERY MORNING
Qty: 30 TABLET | Refills: 5 | Status: SHIPPED | OUTPATIENT
Start: 2024-12-26

## 2025-01-22 DIAGNOSIS — I10 PRIMARY HYPERTENSION: Chronic | ICD-10-CM

## 2025-01-22 RX ORDER — AMLODIPINE BESYLATE 5 MG/1
5 TABLET ORAL NIGHTLY
Qty: 90 TABLET | Refills: 1 | Status: SHIPPED | OUTPATIENT
Start: 2025-01-22

## 2025-02-21 ENCOUNTER — OFFICE VISIT (OUTPATIENT)
Dept: INTERNAL MEDICINE | Age: 55
End: 2025-02-21
Payer: COMMERCIAL

## 2025-02-21 VITALS
HEART RATE: 75 BPM | DIASTOLIC BLOOD PRESSURE: 98 MMHG | TEMPERATURE: 97.1 F | BODY MASS INDEX: 29.89 KG/M2 | OXYGEN SATURATION: 99 % | HEIGHT: 66 IN | WEIGHT: 186 LBS | SYSTOLIC BLOOD PRESSURE: 146 MMHG

## 2025-02-21 DIAGNOSIS — I10 PRIMARY HYPERTENSION: Chronic | ICD-10-CM

## 2025-02-21 DIAGNOSIS — M26.621 ARTHRALGIA OF RIGHT TEMPOROMANDIBULAR JOINT: Primary | ICD-10-CM

## 2025-02-21 PROCEDURE — 99214 OFFICE O/P EST MOD 30 MIN: CPT | Performed by: INTERNAL MEDICINE

## 2025-02-21 RX ORDER — AMLODIPINE BESYLATE 10 MG/1
10 TABLET ORAL NIGHTLY
Qty: 90 TABLET | Refills: 1 | Status: SHIPPED | OUTPATIENT
Start: 2025-02-21

## 2025-02-21 NOTE — ASSESSMENT & PLAN NOTE
This is a new problem to this examiner.  Probable TMJ (right side). Pain is improving and jaw movement is normal at this time. Take Tylenol PRN pain along with heat/ice as needed. Avoid chewing on hard food. See dentist for nighttime bite guard fitting. Consider CT of TMJ if not improving.

## 2025-02-21 NOTE — PROGRESS NOTES
"        J  U  N  O  H    K  I  M ,   M  D       I  N  T  E  R  N  A  L    M  E  D  I  C  I  N  E         ENCOUNTER DATE:  02/21/2025    Mega Santiago / 54 y.o. / male    OFFICE VISIT ENCOUNTER       CHIEF COMPLAINT / REASON FOR OFFICE VISIT     Right jaw pain       ASSESSMENT & PLAN     Problem List Items Addressed This Visit          High    Hypertension (Chronic)    Current Assessment & Plan     BP Readings from Last 3 Encounters:   02/21/25 146/98   09/27/24 126/88   03/22/24 130/82      Blood pressure is high (maybe from pain and some emotional stress from recent death of complains coworker).   Increase amlodipine to 10 mg daily.   Continue olmesartan 40 mg daily.   Avoid NSAIDS.   Monitor home blood pressure readings.  Update me on blood pressure in 1-2 weeks.          Relevant Medications    olmesartan (BENICAR) 40 MG tablet    amLODIPine (NORVASC) 10 MG tablet    Arthralgia of right temporomandibular joint - Primary    Current Assessment & Plan     This is a new problem to this examiner.  Probable TMJ (right side). Pain is improving and jaw movement is normal at this time. Take Tylenol PRN pain along with heat/ice as needed. Avoid chewing on hard food. See dentist for nighttime bite guard fitting. Consider CT of TMJ if not improving.           No orders of the defined types were placed in this encounter.    New Medications Ordered This Visit   Medications    amLODIPine (NORVASC) 10 MG tablet     Sig: Take 1 tablet by mouth Every Night.     Dispense:  90 tablet     Refill:  1       SUMMARY/DISCUSSION        TOTAL TIME OF ENCOUNTER:        Next Appointment with me: 3/31/2025    No follow-ups on file.      VITAL SIGNS     Vitals:    02/21/25 1531   BP: 146/98   Pulse: 75   Temp: 97.1 °F (36.2 °C)   SpO2: 99%   Weight: 84.4 kg (186 lb)   Height: 167.6 cm (65.98\")       BP Readings from Last 3 Encounters:   02/21/25 146/98   09/27/24 126/88   03/22/24 130/82     Wt Readings from Last 3 Encounters:   02/21/25 " 84.4 kg (186 lb)   09/27/24 83.9 kg (185 lb)   03/22/24 82.6 kg (182 lb)     Body mass index is 30.04 kg/m².    Blood pressure readings recorded on patient flowsheet:       No data to display                  MEDICATIONS AT THE TIME OF OFFICE VISIT     Current Outpatient Medications on File Prior to Visit   Medication Sig    atorvastatin (LIPITOR) 40 MG tablet TAKE 1 TABLET BY MOUTH DAILY    Blood Glucose Monitoring Suppl (ONE TOUCH ULTRA 2) w/Device kit Check sugar daily.    cetirizine (zyrTEC) 10 MG tablet Take 1 tablet by mouth Every Night.    Cholecalciferol (VITAMIN D) 2000 UNITS capsule Take 1 capsule by mouth.    Farxiga 10 MG tablet TAKE 1 TABLET BY MOUTH EVERY MORNING    glucose blood (OneTouch Ultra) test strip Use as instructed    Lancets (onetouch ultrasoft) lancets Use as instructed    metFORMIN ER (GLUCOPHAGE-XR) 500 MG 24 hr tablet TAKE 2 TABLETS BY MOUTH TWICE DAILY (Patient taking differently: Take 2 tablets by mouth 2 (Two) Times a Day. Take 500 mg bid)    olmesartan (BENICAR) 40 MG tablet TAKE 1 TABLET BY MOUTH DAILY    Semaglutide, 1 MG/DOSE, (Ozempic, 1 MG/DOSE,) 4 MG/3ML solution pen-injector INJECT 1MG UNDER THE SKIN ONCE A WEEK.    [DISCONTINUED] amLODIPine (NORVASC) 5 MG tablet TAKE 1 TABLET BY MOUTH EVERY NIGHT     No current facility-administered medications on file prior to visit.          HISTORY OF PRESENT ILLNESS     Developed acute pain and inability to open his jaw upon awakening one morning about 7 days ago. He had pain / discomfort of right ear. Pain was worse with eating and yawning. Pain has been improving since and is now able to fully open his jaw. He inquires whether it could be TMJ.   Takes Tylenol for pain (no NSAIDS) but blood pressure is notably higher. Compliant with blood pressure medications.     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  In December he lost one of his longtime coworker due to  sudden death which seems to weigh on him emotionally.  He has been pursuing counseling/therapy.      Answers submitted by the patient for this visit:  Ear Pain Questionnaire (Submitted on 2/21/2025)  Chief Complaint: Ear pain  Affected ear: right  Chronicity: new  Onset: in the past 7 days  Progression since onset: unchanged  Frequency: daily  Fever: no fever  Pain - numeric: 5/10  drainage: Yes  jaw pain: Yes  Treatments tried : acetaminophen  Improvement on treatment: mild  Additional Information: Experience pain  in right jaw when eating and yawning      Patient Care Team:  Alber Gray MD as PCP - Cesar Sweet DO as Consulting Physician (Ophthalmology)    REVIEW OF SYSTEMS     Review of Systems   No fever   No dental problem that he knows of   No acute / focal neuro changes   No sinus pain   No chest pain     PHYSICAL EXAMINATION     Physical Exam  Constitutional:       General: He is not in acute distress.     Appearance: He is not ill-appearing.   HENT:      Head: Normocephalic.      Jaw: There is normal jaw occlusion. No trismus, tenderness (currently no TJ TTP), swelling, pain on movement or malocclusion.      Salivary Glands: Right salivary gland is not diffusely enlarged or tender. Left salivary gland is not diffusely enlarged or tender.      Right Ear: Tympanic membrane, ear canal and external ear normal.      Left Ear: Tympanic membrane, ear canal and external ear normal.      Mouth/Throat:      Mouth: Mucous membranes are moist. No oral lesions.      Dentition: Abnormal dentition: evidence of bruxism and artificial crowns. No gingival swelling or gum lesions.      Tongue: No lesions.      Palate: No mass and lesions.      Pharynx: Oropharynx is clear. Uvula midline.      Tonsils: No tonsillar exudate or tonsillar abscesses.   Neurological:      Mental Status: He is alert.         REVIEWED DATA     Labs:     Lab Results   Component Value Date     09/27/2024    K 4.1 09/27/2024     CALCIUM 9.2 09/27/2024    AST 12 09/27/2024    ALT 14 09/27/2024    BUN 16 09/27/2024    CREATININE 1.16 09/27/2024    CREATININE 1.10 03/22/2024    CREATININE 0.97 11/20/2023     Lab Results   Component Value Date    HGBA1C 5.40 09/27/2024    HGBA1C 5.40 03/22/2024    HGBA1C 5.60 11/20/2023     Lab Results   Component Value Date     (H) 09/27/2024    LDL 80 03/22/2024    LDL 52 05/18/2023    HDL 53 09/27/2024    HDL 64 (H) 03/22/2024    TRIG 72 09/27/2024    TRIG 71 03/22/2024     Lab Results   Component Value Date    TSH 1.250 07/26/2022    TSH 2.070 02/14/2022    TSH 1.900 09/12/2019    FREET4 1.48 07/26/2022    FREET4 1.43 02/14/2022    FREET4 1.49 09/12/2019     Lab Results   Component Value Date    WBC 8.74 11/11/2022    HGB 14.7 11/11/2022     11/11/2022     Lab Results   Component Value Date    MALBCRERATIO 12 03/22/2024           Imaging:               Medical Tests:               Summary of old records / correspondence / consultant report:             Request outside records:

## 2025-02-21 NOTE — ASSESSMENT & PLAN NOTE
BP Readings from Last 3 Encounters:   02/21/25 146/98   09/27/24 126/88   03/22/24 130/82      Blood pressure is high (maybe from pain and some emotional stress from recent death of complains coworker).   Increase amlodipine to 10 mg daily.   Continue olmesartan 40 mg daily.   Avoid NSAIDS.   Monitor home blood pressure readings.  Update me on blood pressure in 1-2 weeks.

## 2025-03-02 DIAGNOSIS — E11.9 TYPE 2 DIABETES MELLITUS WITHOUT COMPLICATION, WITHOUT LONG-TERM CURRENT USE OF INSULIN: ICD-10-CM

## 2025-03-02 DIAGNOSIS — E78.2 MIXED HYPERLIPIDEMIA: Chronic | ICD-10-CM

## 2025-03-04 RX ORDER — SEMAGLUTIDE 1.34 MG/ML
INJECTION, SOLUTION SUBCUTANEOUS
Qty: 9 ML | Refills: 0 | Status: SHIPPED | OUTPATIENT
Start: 2025-03-04

## 2025-03-04 RX ORDER — ATORVASTATIN CALCIUM 40 MG/1
40 TABLET, FILM COATED ORAL DAILY
Qty: 90 TABLET | Refills: 1 | Status: SHIPPED | OUTPATIENT
Start: 2025-03-04

## 2025-03-26 ENCOUNTER — PRIOR AUTHORIZATION (OUTPATIENT)
Dept: INTERNAL MEDICINE | Age: 55
End: 2025-03-26
Payer: COMMERCIAL

## 2025-03-26 NOTE — TELEPHONE ENCOUNTER
Ozempic (1 MG/DOSE) 4MG/3ML pen-injectors        [employer]           Drug is covered by current benefit plan. No further PA activity needed

## 2025-03-31 ENCOUNTER — OFFICE VISIT (OUTPATIENT)
Dept: INTERNAL MEDICINE | Age: 55
End: 2025-03-31
Payer: COMMERCIAL

## 2025-03-31 VITALS
TEMPERATURE: 97.1 F | SYSTOLIC BLOOD PRESSURE: 130 MMHG | DIASTOLIC BLOOD PRESSURE: 91 MMHG | OXYGEN SATURATION: 98 % | WEIGHT: 186 LBS | BODY MASS INDEX: 29.89 KG/M2 | HEART RATE: 73 BPM | HEIGHT: 66 IN

## 2025-03-31 DIAGNOSIS — C61 PROSTATE CANCER: Chronic | ICD-10-CM

## 2025-03-31 DIAGNOSIS — E78.2 MIXED HYPERLIPIDEMIA: Chronic | ICD-10-CM

## 2025-03-31 DIAGNOSIS — E11.9 TYPE 2 DIABETES MELLITUS WITHOUT COMPLICATION, WITHOUT LONG-TERM CURRENT USE OF INSULIN: Chronic | ICD-10-CM

## 2025-03-31 DIAGNOSIS — E66.09 CLASS 1 OBESITY DUE TO EXCESS CALORIES WITH SERIOUS COMORBIDITY AND BODY MASS INDEX (BMI) OF 30.0 TO 30.9 IN ADULT: Chronic | ICD-10-CM

## 2025-03-31 DIAGNOSIS — I10 PRIMARY HYPERTENSION: Chronic | ICD-10-CM

## 2025-03-31 DIAGNOSIS — Z00.00 ENCOUNTER FOR ANNUAL HEALTH EXAMINATION: Primary | ICD-10-CM

## 2025-03-31 DIAGNOSIS — E66.811 CLASS 1 OBESITY DUE TO EXCESS CALORIES WITH SERIOUS COMORBIDITY AND BODY MASS INDEX (BMI) OF 30.0 TO 30.9 IN ADULT: Chronic | ICD-10-CM

## 2025-03-31 PROBLEM — G62.9 PERIPHERAL POLYNEUROPATHY: Chronic | Status: RESOLVED | Noted: 2024-03-22 | Resolved: 2025-03-31

## 2025-03-31 RX ORDER — METFORMIN HYDROCHLORIDE 500 MG/1
500 TABLET, EXTENDED RELEASE ORAL 2 TIMES DAILY
COMMUNITY
Start: 2025-03-31

## 2025-03-31 RX ORDER — AMLODIPINE BESYLATE 5 MG/1
5 TABLET ORAL NIGHTLY
COMMUNITY
Start: 2025-03-31

## 2025-03-31 NOTE — PROGRESS NOTES
"        J  U  N  O  H    K  I  M ,   M  D      I  N  T  E  R  N  A  L    M  E  D  I  C  I  N  E         ENCOUNTER DATE:  03/31/2025    Mega Santiago / 54 y.o. / male    ANNUAL PREVENTATIVE / PHYSICAL ENCOUNTER       CHIEF COMPLAINT     Annual Exam (8/2/22), Diabetes, Hyperlipidemia, and Hypertension      VITALS     Vitals:    03/31/25 0746   BP: 130/91   Pulse: 73   Temp: 97.1 °F (36.2 °C)   SpO2: 98%   Weight: 84.4 kg (186 lb)   Height: 167.6 cm (65.98\")       BP Readings from Last 3 Encounters:   03/31/25 130/91   02/21/25 146/98   09/27/24 126/88     Wt Readings from Last 3 Encounters:   03/31/25 84.4 kg (186 lb)   02/21/25 84.4 kg (186 lb)   09/27/24 83.9 kg (185 lb)      Body mass index is 30.04 kg/m².    Blood pressure readings recorded on patient flowsheet:       No data to display                MEDICATIONS     Current Outpatient Medications on File Prior to Visit   Medication Sig Dispense Refill    amLODIPine (NORVASC) 5 MG tablet Take 1 tablet by mouth Every Night.      atorvastatin (LIPITOR) 40 MG tablet TAKE 1 TABLET BY MOUTH DAILY 90 tablet 1    Blood Glucose Monitoring Suppl (ONE TOUCH ULTRA 2) w/Device kit Check sugar daily. 1 each 0    cetirizine (zyrTEC) 10 MG tablet Take 1 tablet by mouth Every Night.      Cholecalciferol (VITAMIN D) 2000 UNITS capsule Take 1 capsule by mouth.      Farxiga 10 MG tablet TAKE 1 TABLET BY MOUTH EVERY MORNING 30 tablet 5    glucose blood (OneTouch Ultra) test strip Use as instructed 100 each 3    Lancets (onetouch ultrasoft) lancets Use as instructed 100 each 3    metFORMIN ER (GLUCOPHAGE-XR) 500 MG 24 hr tablet Take 1 tablet by mouth 2 (Two) Times a Day.      olmesartan (BENICAR) 40 MG tablet TAKE 1 TABLET BY MOUTH DAILY 90 tablet 1    Ozempic, 1 MG/DOSE, 4 MG/3ML solution pen-injector INJECT 1MG UNDER THE SKIN ONCE A WEEK. 9 mL 0       HISTORY OF PRESENT ILLNESS      Mega presents for annual health maintenance visit.    Previous PSA level was noted to be " higher and he has an upcoming follow-up with his urologist with a repeat PSA.  Prior MRI was negative for suspicious findings.  Denies significant changes in urination.  Overall diabetes remains stable on Ozempic 1 mg weekly, Farxiga 10 mg and metformin  mg twice daily.  Urine analysis shows persistent trace proteinuria.  He did not provide a clean-catch specimen.  LDL cholesterol is significantly better with improved medication compliance on atorvastatin 40 mg.    Patient Care Team:  Alber Gray MD as PCP - General  Minneola, Cesar Barkley DO as Consulting Physician (Ophthalmology)    General health: multiple medical problems  Lifestyle:  Attempting to lose weight?: Yes   Diet: eats decently  Exercise: generally sedentary  Tobacco: Never used  Alcohol: occasional/infrequent  Work: Full-time  Reproductive health:  Sexually active?: Yes   Concern for STD?: No   Sexual problems?: No problems   Sees Urologist?: Yes for Prostate disease  Depression Screening:      PHQ-2 Depression Screening  Little interest or pleasure in doing things? Not at all   Feeling down, depressed, or hopeless? Not at all   PHQ-2 Total Score 0              3/31/2025     7:52 AM   PHQ-2/PHQ-9 Depression Screening   Little interest or pleasure in doing things Not at all   Feeling down, depressed, or hopeless Not at all   How difficult have these problems made it for you to do your work, take care of things at home, or get along with other people? Not difficult at all        PHQ-2: 0 (Not depressed)     PHQ-9: 0 (Negative screening for depression)    ______________________________________________________________________    ALLERGIES  No Known Allergies     PFSH:     The following portions of the patient's history were reviewed and updated as appropriate: Allergies / Current Medications / Past Medical History / Surgical History / Social History / Family History    PROBLEM LIST   Patient Active Problem List   Diagnosis    Atopic rhinitis     Hyperlipidemia    Hypertension    Hypogonadism in male    Class 1 obesity due to excess calories with serious comorbidity and body mass index (BMI) of 30.0 to 30.9 in adult    Type 2 diabetes mellitus without complication    Vitamin D deficiency    Prostate cancer    DDD (degenerative disc disease), lumbar    Hydroureteronephrosis    Ureterolithiasis    Gastroesophageal reflux disease with esophagitis without hemorrhage    Lateral epicondylitis of left elbow    Right shoulder tendonitis    Arthralgia of right temporomandibular joint       PAST MEDICAL HISTORY  Past Medical History:   Diagnosis Date    Diabetes mellitus     GERD (gastroesophageal reflux disease)     Hyperlipidemia     Hypertension     Hypogonadism in male     Obesity     Obstructive sleep apnea syndrome 05/03/2016    Came off CPAP after weight loss     Sleep apnea     Vitamin D deficiency        SURGICAL HISTORY  Past Surgical History:   Procedure Laterality Date    PROSTATE BIOPSY  01/2023    multiple       SOCIAL HISTORY  Social History     Socioeconomic History    Marital status:      Spouse name: Lisa    Number of children: 2   Tobacco Use    Smoking status: Never    Smokeless tobacco: Never   Vaping Use    Vaping status: Never Used   Substance and Sexual Activity    Alcohol use: Yes     Comment: occasional     Drug use: No    Sexual activity: Yes     Partners: Female       FAMILY HISTORY  Family History   Problem Relation Age of Onset    Bipolar disorder Mother     Hypertension Mother     Hyperlipidemia Mother     Parkinsonism Mother 84        with dementia (dxed at age 84)    Diabetes type II Father     Benign prostatic hyperplasia Father     Autoimmune disease Father     No Known Problems Sister     Breast cancer Maternal Grandmother     Hypertension Maternal Grandmother     Diabetes type II Maternal Grandmother     No Known Problems Paternal Grandmother     Colonic polyp Paternal Grandfather     Prostate cancer Paternal Grandfather  80    Diabetes type II Paternal Grandfather     Hypothyroidism Maternal Aunt     Alzheimer's disease Maternal Aunt 85    Stomach cancer Maternal Aunt     Parkinsonism Maternal Uncle 65    Dementia Maternal Uncle     Colon cancer Neg Hx        IMMUNIZATION HISTORY  Immunization History   Administered Date(s) Administered    COVID-19 (PFIZER) 12YRS+ (COMIRNATY) 10/20/2023    COVID-19 (PFIZER) BIVALENT 12+YRS 05/18/2023    COVID-19 (PFIZER) Purple Cap Monovalent 03/26/2021, 04/23/2021, 01/12/2022    Covid-19 (Pfizer) Gray Cap Monovalent 07/17/2022    Flu Vaccine Quad PF >36MO 12/19/2017    Flublok 18+yrs 11/02/2019    Fluzone  >6mos 09/27/2024    Fluzone (or Fluarix & Flulaval for VFC) >6mos 09/11/2020, 09/27/2021, 10/23/2023    Hepatitis A 05/08/2018, 12/14/2018    Hepatitis B 12/31/2020, 01/29/2021, 06/30/2021    Influenza TIV (IM) 10/01/2015    Influenza, Unspecified 11/02/2019    Pneumococcal Conjugate 13-Valent (PCV13) 04/03/2017    Pneumococcal Polysaccharide (PPSV23) 12/19/2017    Td (TDVAX) 09/09/2017    Tdap 09/28/2015    flucelvax quad pfs =>4 YRS 10/26/2018         REVIEW OF SYSTEMS     Review of Systems   Constitutional: Negative.  Negative for unexpected weight change.   HENT: Negative.     Eyes: Negative.    Respiratory:  Positive for apnea (not on cpap now).    Cardiovascular: Negative.    Gastrointestinal: Negative.    Endocrine: Negative.    Genitourinary: Negative.    Musculoskeletal: Negative.    Skin: Negative.    Allergic/Immunologic: Negative.    Neurological: Negative.    Hematological: Negative.    Psychiatric/Behavioral: Negative.           PHYSICAL EXAMINATION     Physical Exam  Constitutional:       Appearance: He is obese.   HENT:      Head: Normocephalic.      Right Ear: Tympanic membrane, ear canal and external ear normal.      Left Ear: Tympanic membrane, ear canal and external ear normal.      Mouth/Throat:      Mouth: Mucous membranes are moist.      Pharynx: Oropharynx is clear.       Comments: Mellampati Airway Class 3   Eyes:      General: No scleral icterus.     Conjunctiva/sclera: Conjunctivae normal.      Pupils: Pupils are equal, round, and reactive to light.   Neck:      Thyroid: No thyromegaly.      Vascular: No carotid bruit.      Trachea: No tracheal deviation.   Cardiovascular:      Rate and Rhythm: Normal rate and regular rhythm.      Pulses: Normal pulses.      Heart sounds: Normal heart sounds. No murmur heard.     No friction rub. No gallop.      Comments: No carotid bruits  Pulmonary:      Effort: Pulmonary effort is normal.      Breath sounds: Normal breath sounds.   Abdominal:      General: Abdomen is protuberant. There is no distension.      Palpations: Abdomen is soft. There is no mass.      Tenderness: There is no abdominal tenderness.      Hernia: No hernia is present.   Musculoskeletal:      Cervical back: Neck supple.      Right lower leg: No edema.      Left lower leg: No edema.   Lymphadenopathy:      Cervical: No cervical adenopathy.      Upper Body:      Right upper body: No supraclavicular adenopathy.      Left upper body: No supraclavicular adenopathy.   Skin:     Coloration: Skin is not jaundiced or pale.      Findings: No erythema, lesion (Negative for suspicious skin lesions/growths) or rash.      Nails: There is no clubbing.      Comments: No suspicious skin lesions.    Neurological:      Mental Status: He is alert and oriented to person, place, and time.      Cranial Nerves: No cranial nerve deficit.      Motor: No abnormal muscle tone.   Psychiatric:         Mood and Affect: Mood normal.         Behavior: Behavior normal.         Thought Content: Thought content normal.         Judgment: Judgment normal.         REVIEWED DATA      Labs:    Lab Results   Component Value Date     03/24/2025    K 4.0 03/24/2025    CALCIUM 9.6 03/24/2025    AST 17 03/24/2025    ALT 16 03/24/2025    BUN 14 03/24/2025    CREATININE 1.14 03/24/2025    CREATININE 1.16 09/27/2024  "   CREATININE 1.10 03/22/2024    EGFRIFNONA 71 09/27/2021    EGFRIFAFRI 86 09/27/2021     Lab Results   Component Value Date    GLUCOSE 93 03/24/2025    HGBA1C 5.50 03/24/2025    HGBA1C 5.40 09/27/2024    HGBA1C 5.40 03/22/2024    TSH 1.450 03/24/2025    FREET4 1.39 03/24/2025     Lab Results   Component Value Date    PSA 3.1 07/26/2022    PSA 2.29 12/15/2014     No results found for: \"TESTOSTERONE\", \"TESTOSTEROTT\", \"TESTFRE\"  No results found for: \"LABLH\", \"FSH\"  Lab Results   Component Value Date    LDL 66 03/24/2025    HDL 59 03/24/2025    TRIG 65 03/24/2025    CHOLHDLRATIO 2.34 03/24/2025   No components found for: \"VUVB195F\"  Lab Results   Component Value Date    WBC 6.53 03/24/2025    HGB 15.7 03/24/2025    MCV 97.8 (H) 03/24/2025     03/24/2025     Lab Results   Component Value Date    PROTEIN Trace (A) 03/24/2025    GLUCOSEU See below: (A) 03/24/2025    BLOODU Negative 03/24/2025    NITRITEU Negative 03/24/2025    LEUKOCYTESUR Negative 03/24/2025     Lab Results   Component Value Date    HEPCVIRUSABY <0.1 12/31/2020     Lab Results   Component Value Date    PSA 3.1 07/26/2022    PSA 2.29 12/15/2014      Imaging:                   Medical Tests:                 Summary of old records / correspondence / consultant report:               Request outside records:         ASSESSMENT & PLAN     ANNUAL WELLNESS EXAM / PHYSICAL     Other medical problems addressed today:  Problem List Items Addressed This Visit          High    Type 2 diabetes mellitus without complication (Chronic)    Overview   Continue   Ozempic 1 mg weekly  Farxiga 10 mg   metformin  mg BID.          Current Assessment & Plan   Lab Results   Component Value Date    HGBA1C 5.50 03/24/2025    HGBA1C 5.40 09/27/2024    HGBA1C 5.40 03/22/2024    CREATININE 1.14 03/24/2025    LDL 66 03/24/2025    MALBCRERATIO 12 03/22/2024      Check microalbumin/creatinine urine ratio  Same medications          Relevant Medications    Blood Glucose " Monitoring Suppl (ONE TOUCH ULTRA 2) w/Device kit    glucose blood (OneTouch Ultra) test strip    Lancets (onetouch ultrasoft) lancets    olmesartan (BENICAR) 40 MG tablet    Farxiga 10 MG tablet    Ozempic, 1 MG/DOSE, 4 MG/3ML solution pen-injector    metFORMIN ER (GLUCOPHAGE-XR) 500 MG 24 hr tablet    Other Relevant Orders    Microalbumin / Creatinine Urine Ratio - Urine, Clean Catch       Medium    Hyperlipidemia (Chronic)    Overview   Continue atorvastatin 40 mg qd.          Relevant Medications    atorvastatin (LIPITOR) 40 MG tablet    Hypertension (Chronic)    Current Assessment & Plan   Blood pressure is generally < 130/80 at home.   Continue same medications for now.     Taking olmesartan 40 mg and amlodipine 5 mg daily.          Relevant Medications    olmesartan (BENICAR) 40 MG tablet    amLODIPine (NORVASC) 5 MG tablet    Other Relevant Orders    Microalbumin / Creatinine Urine Ratio - Urine, Clean Catch       Low    Class 1 obesity due to excess calories with serious comorbidity and body mass index (BMI) of 30.0 to 30.9 in adult (Chronic)    Current Assessment & Plan   Wt Readings from Last 3 Encounters:   03/31/25 84.4 kg (186 lb)   02/21/25 84.4 kg (186 lb)   09/27/24 83.9 kg (185 lb)     Body mass index is 30.04 kg/m².     Weight remains stable overall. Continue same. On Ozempic 1 mg.         Prostate cancer (Chronic)    Overview   *Seen at [facility]: planned 1 year observation, no treatment at this time    1/2023: biopsies negative for cancer         Current Assessment & Plan   Prior PSA was higher. Has upcoming follow-up with urologist with PSA.           Other Visit Diagnoses         Encounter for annual health examination    -  Primary            Orders Placed This Encounter   Procedures    Microalbumin / Creatinine Urine Ratio - Urine, Clean Catch     Standing Status:   Future     Expected Date:   3/31/2025     Expiration Date:   6/30/2026     Release to patient:   Routine  Release [[id number]]        Summary/Discussion:         Next Appointment with me: Visit date not found    Return in about 6 months (around 9/30/2025) for Reassess chronic medical problems.      HEALTHCARE MAINTENANCE ISSUES     Health Maintenance   Topic Date Due    Pneumococcal Vaccine 50+ (3 of 3 - PCV20 or PCV21) 12/19/2022    COVID-19 Vaccine (7 - 2024-25 season) 09/01/2024    COLORECTAL CANCER SCREENING  03/02/2025    DIABETIC EYE EXAM  03/13/2025    URINE MICROALBUMIN-CREATININE RATIO (uACR)  03/22/2025    HEMOGLOBIN A1C  09/24/2025    LIPID PANEL  03/24/2026    ANNUAL PHYSICAL  03/31/2026    TDAP/TD VACCINES (3 - Td or Tdap) 09/09/2027    HEPATITIS C SCREENING  Completed    Hepatitis B  Completed    INFLUENZA VACCINE  Completed    ZOSTER VACCINE  Discontinued       Cancer Screening:  Colon: Initial/Next screening at age: OVERDUE and he will let me know which provider he wants to see for colonoscopy (wants to coordinate with his spouse)  Repeat colon cancer screening:  above  Prostate:  managed by urologist (has upcoming follow-up with PSA)  Lung: Does not meet criteria for lung cancer screening.   Testicular: Recommended monthly self exam  Skin: Monthly self skin examination, annual exam by health professional  Other:    Miscellaneous Screening:  CV Screening: Lipid panel  DEXA (75+ or risk factors):   Other:     Immunization/Vaccinations:    Immunization History   Administered Date(s) Administered    COVID-19 (PFIZER) 12YRS+ (COMIRNATY) 10/20/2023    COVID-19 (PFIZER) BIVALENT 12+YRS 05/18/2023    COVID-19 (PFIZER) Purple Cap Monovalent 03/26/2021, 04/23/2021, 01/12/2022    Covid-19 (Pfizer) Gray Cap Monovalent 07/17/2022    Flu Vaccine Quad PF >36MO 12/19/2017    Flublok 18+yrs 11/02/2019    Fluzone  >6mos 09/27/2024    Fluzone (or Fluarix & Flulaval for VFC) >6mos 09/11/2020, 09/27/2021, 10/23/2023    Hepatitis A 05/08/2018, 12/14/2018    Hepatitis B 12/31/2020, 01/29/2021, 06/30/2021    Influenza TIV (IM)  10/01/2015    Influenza, Unspecified 11/02/2019    Pneumococcal Conjugate 13-Valent (PCV13) 04/03/2017    Pneumococcal Polysaccharide (PPSV23) 12/19/2017    Td (TDVAX) 09/09/2017    Tdap 09/28/2015    flucelvax quad pfs =>4 YRS 10/26/2018        Influenza: Patient had the flu shot this season  Pneumococcal: Recommended here or at pharmacy  COVID: Does not plan to get the latest booster  RSV: Not indicated  Tetanus/Pertussis: Up to date  Hepatitis A: Up to date  Hepatitis B: Up to date  Shingles: Recommended Shingrix vaccine  HPV: N/A    Lifestyle Counseling:  Lifestyle Modifications: Attempt to lose weight, Improve dietary compliance, Begin progressive aerobic exercise program 3-5 days a week, Maintain a low sugar/carbohydrate diet, Follow a low fat, low cholesterol diet, Maintain low sodium diet (< 3 gm) for blood pressure, Make dinner the lightest meal of day, Reduce exposure to stress if possible, Discussed better management of stress/anxiety, and Discussed sexual issues, safe sex practices, contraception  Safety Issues: Always wear seatbelt, Avoid texting while driving   Use sunscreen, regular skin examination  Recommended annual dental/vision examination.  Emotional/Stress/Sleep: Reviewed and  given when appropriate

## 2025-03-31 NOTE — ASSESSMENT & PLAN NOTE
Wt Readings from Last 3 Encounters:   03/31/25 84.4 kg (186 lb)   02/21/25 84.4 kg (186 lb)   09/27/24 83.9 kg (185 lb)     Body mass index is 30.04 kg/m².     Weight remains stable overall. Continue same. On Ozempic 1 mg.

## 2025-03-31 NOTE — ASSESSMENT & PLAN NOTE
Lab Results   Component Value Date    HGBA1C 5.50 03/24/2025    HGBA1C 5.40 09/27/2024    HGBA1C 5.40 03/22/2024    CREATININE 1.14 03/24/2025    LDL 66 03/24/2025    MALBCRERATIO 12 03/22/2024      Check microalbumin/creatinine urine ratio  Same medications

## 2025-03-31 NOTE — ASSESSMENT & PLAN NOTE
Blood pressure is generally < 130/80 at home.   Continue same medications for now.     Taking olmesartan 40 mg and amlodipine 5 mg daily.

## 2025-05-29 DIAGNOSIS — E11.9 TYPE 2 DIABETES MELLITUS WITHOUT COMPLICATION, WITHOUT LONG-TERM CURRENT USE OF INSULIN: ICD-10-CM

## 2025-06-02 RX ORDER — METFORMIN HYDROCHLORIDE 500 MG/1
500 TABLET, EXTENDED RELEASE ORAL 2 TIMES DAILY
Qty: 180 TABLET | Refills: 1 | Status: SHIPPED | OUTPATIENT
Start: 2025-06-02

## 2025-06-02 RX ORDER — SEMAGLUTIDE 1.34 MG/ML
1 INJECTION, SOLUTION SUBCUTANEOUS WEEKLY
Qty: 9 ML | Refills: 0 | Status: SHIPPED | OUTPATIENT
Start: 2025-06-02

## 2025-07-01 DIAGNOSIS — I10 PRIMARY HYPERTENSION: Chronic | ICD-10-CM

## 2025-07-01 DIAGNOSIS — E11.9 TYPE 2 DIABETES MELLITUS WITHOUT COMPLICATION, WITHOUT LONG-TERM CURRENT USE OF INSULIN: Chronic | ICD-10-CM

## 2025-07-01 RX ORDER — AMLODIPINE BESYLATE 5 MG/1
5 TABLET ORAL NIGHTLY
Qty: 90 TABLET | Refills: 1 | Status: SHIPPED | OUTPATIENT
Start: 2025-07-01

## 2025-07-01 RX ORDER — OLMESARTAN MEDOXOMIL 40 MG/1
40 TABLET ORAL DAILY
Qty: 90 TABLET | Refills: 1 | Status: SHIPPED | OUTPATIENT
Start: 2025-07-01

## 2025-07-03 DIAGNOSIS — E11.9 TYPE 2 DIABETES MELLITUS WITHOUT COMPLICATION, WITHOUT LONG-TERM CURRENT USE OF INSULIN: Chronic | ICD-10-CM

## 2025-07-03 RX ORDER — DAPAGLIFLOZIN 10 MG/1
1 TABLET, FILM COATED ORAL EVERY MORNING
Qty: 30 TABLET | Refills: 5 | Status: SHIPPED | OUTPATIENT
Start: 2025-07-03